# Patient Record
Sex: MALE | Race: WHITE | Employment: FULL TIME | URBAN - NONMETROPOLITAN AREA
[De-identification: names, ages, dates, MRNs, and addresses within clinical notes are randomized per-mention and may not be internally consistent; named-entity substitution may affect disease eponyms.]

---

## 2017-02-03 RX ORDER — METOPROLOL SUCCINATE 100 MG/1
TABLET, EXTENDED RELEASE ORAL
Qty: 90 TABLET | Refills: 0 | Status: SHIPPED | OUTPATIENT
Start: 2017-02-03 | End: 2017-05-12 | Stop reason: ALTCHOICE

## 2017-05-08 RX ORDER — METOPROLOL SUCCINATE 100 MG/1
TABLET, EXTENDED RELEASE ORAL
Qty: 90 TABLET | Refills: 0 | Status: SHIPPED | OUTPATIENT
Start: 2017-05-08 | End: 2017-05-12 | Stop reason: ALTCHOICE

## 2017-05-12 ENCOUNTER — TELEPHONE (OUTPATIENT)
Dept: FAMILY MEDICINE CLINIC | Facility: CLINIC | Age: 37
End: 2017-05-12

## 2017-05-12 DIAGNOSIS — I10 ESSENTIAL HYPERTENSION: Primary | ICD-10-CM

## 2017-05-12 RX ORDER — METOPROLOL SUCCINATE 50 MG/1
50 TABLET, EXTENDED RELEASE ORAL 2 TIMES DAILY
Qty: 180 TABLET | Refills: 0 | Status: SHIPPED | OUTPATIENT
Start: 2017-05-12 | End: 2017-08-09

## 2017-05-12 NOTE — TELEPHONE ENCOUNTER
metoprolol  100mg tab  1 by mouth daily    patient is requesting  50mg to be taken 2 x a day-  would Dr change the prescription and call to [facility] in [place]-  cost is much cheaper with the 50mg tablets. Please advise  Last seen 9/28/2016.

## 2017-08-09 DIAGNOSIS — I10 ESSENTIAL HYPERTENSION: ICD-10-CM

## 2017-08-09 RX ORDER — METOPROLOL SUCCINATE 50 MG/1
TABLET, EXTENDED RELEASE ORAL
Qty: 60 TABLET | Refills: 0 | Status: SHIPPED | OUTPATIENT
Start: 2017-08-09 | End: 2017-09-12

## 2017-08-09 NOTE — TELEPHONE ENCOUNTER
Last OV: 9/28/2016 122/80  Last filled: 5/12/2017 #90 no RF    Letter sent for office visit  No future appointments.

## 2017-08-21 ENCOUNTER — OFFICE VISIT (OUTPATIENT)
Dept: FAMILY MEDICINE CLINIC | Facility: CLINIC | Age: 37
End: 2017-08-21

## 2017-08-21 VITALS
BODY MASS INDEX: 27.3 KG/M2 | DIASTOLIC BLOOD PRESSURE: 78 MMHG | HEIGHT: 71 IN | WEIGHT: 195 LBS | TEMPERATURE: 98 F | HEART RATE: 84 BPM | OXYGEN SATURATION: 98 % | SYSTOLIC BLOOD PRESSURE: 130 MMHG

## 2017-08-21 DIAGNOSIS — I10 ESSENTIAL HYPERTENSION: Primary | ICD-10-CM

## 2017-08-21 DIAGNOSIS — F41.9 ANXIETY: ICD-10-CM

## 2017-08-21 PROCEDURE — 99213 OFFICE O/P EST LOW 20 MIN: CPT | Performed by: FAMILY MEDICINE

## 2017-08-21 NOTE — PROGRESS NOTES
HPI:    Patient ID: Juli Gutierrez is a 44year old male. BP stable  W/o problems w/ meds  HPI    Review of Systems   Respiratory: Negative for cough and shortness of breath. Cardiovascular: Negative for chest pain, palpitations and leg swelling.    Micky

## 2017-09-12 DIAGNOSIS — I10 ESSENTIAL HYPERTENSION: ICD-10-CM

## 2017-09-12 RX ORDER — METOPROLOL SUCCINATE 50 MG/1
TABLET, EXTENDED RELEASE ORAL
Qty: 60 TABLET | Refills: 5 | Status: SHIPPED | OUTPATIENT
Start: 2017-09-12 | End: 2018-03-18

## 2018-03-18 DIAGNOSIS — I10 ESSENTIAL HYPERTENSION: ICD-10-CM

## 2018-03-19 RX ORDER — METOPROLOL SUCCINATE 50 MG/1
TABLET, EXTENDED RELEASE ORAL
Qty: 180 TABLET | Refills: 0 | Status: SHIPPED | OUTPATIENT
Start: 2018-03-19 | End: 2018-06-14

## 2018-03-19 NOTE — TELEPHONE ENCOUNTER
Last office visit 8-21-17 130/78  Last refill 9-12-17 #60 with 5  P patient. No future appointments.

## 2018-06-14 DIAGNOSIS — I10 ESSENTIAL HYPERTENSION: ICD-10-CM

## 2018-06-14 RX ORDER — METOPROLOL SUCCINATE 50 MG/1
TABLET, EXTENDED RELEASE ORAL
Qty: 180 TABLET | Refills: 0 | Status: SHIPPED | OUTPATIENT
Start: 2018-06-14 | End: 2018-09-23

## 2018-06-22 ENCOUNTER — LAB ENCOUNTER (OUTPATIENT)
Dept: LAB | Age: 38
End: 2018-06-22
Attending: FAMILY MEDICINE
Payer: COMMERCIAL

## 2018-06-22 ENCOUNTER — OFFICE VISIT (OUTPATIENT)
Dept: FAMILY MEDICINE CLINIC | Facility: CLINIC | Age: 38
End: 2018-06-22

## 2018-06-22 VITALS
TEMPERATURE: 89 F | HEIGHT: 71 IN | SYSTOLIC BLOOD PRESSURE: 132 MMHG | WEIGHT: 200.25 LBS | DIASTOLIC BLOOD PRESSURE: 86 MMHG | BODY MASS INDEX: 28.03 KG/M2

## 2018-06-22 DIAGNOSIS — F41.9 ANXIETY: ICD-10-CM

## 2018-06-22 DIAGNOSIS — I10 ESSENTIAL HYPERTENSION: ICD-10-CM

## 2018-06-22 DIAGNOSIS — F43.29 STRESS AND ADJUSTMENT REACTION: ICD-10-CM

## 2018-06-22 DIAGNOSIS — I10 ESSENTIAL HYPERTENSION: Primary | ICD-10-CM

## 2018-06-22 PROCEDURE — 85025 COMPLETE CBC W/AUTO DIFF WBC: CPT

## 2018-06-22 PROCEDURE — 36415 COLL VENOUS BLD VENIPUNCTURE: CPT

## 2018-06-22 PROCEDURE — 82306 VITAMIN D 25 HYDROXY: CPT

## 2018-06-22 PROCEDURE — 80061 LIPID PANEL: CPT

## 2018-06-22 PROCEDURE — 80053 COMPREHEN METABOLIC PANEL: CPT

## 2018-06-22 PROCEDURE — 99214 OFFICE O/P EST MOD 30 MIN: CPT | Performed by: FAMILY MEDICINE

## 2018-06-22 NOTE — PROGRESS NOTES
HPI:    Patient ID: Raymond Valle is a 43year old male. BP stable  Increase stress / fatigue    HPI    Review of Systems   Constitutional: Positive for diaphoresis. Negative for chills and fever. Respiratory: Negative for cough and shortness of breath.

## 2018-09-23 DIAGNOSIS — I10 ESSENTIAL HYPERTENSION: ICD-10-CM

## 2018-09-24 RX ORDER — METOPROLOL SUCCINATE 50 MG/1
TABLET, EXTENDED RELEASE ORAL
Qty: 180 TABLET | Refills: 0 | Status: SHIPPED | OUTPATIENT
Start: 2018-09-24 | End: 2018-12-24

## 2018-12-24 DIAGNOSIS — I10 ESSENTIAL HYPERTENSION: ICD-10-CM

## 2018-12-24 RX ORDER — METOPROLOL SUCCINATE 50 MG/1
TABLET, EXTENDED RELEASE ORAL
Qty: 180 TABLET | Refills: 0 | Status: SHIPPED | OUTPATIENT
Start: 2018-12-24 | End: 2019-03-26

## 2019-01-01 ENCOUNTER — EXTERNAL RECORD (OUTPATIENT)
Dept: HEALTH INFORMATION MANAGEMENT | Facility: OTHER | Age: 39
End: 2019-01-01

## 2019-03-13 ENCOUNTER — PATIENT OUTREACH (OUTPATIENT)
Dept: FAMILY MEDICINE CLINIC | Facility: CLINIC | Age: 39
End: 2019-03-13

## 2019-03-26 DIAGNOSIS — I10 ESSENTIAL HYPERTENSION: ICD-10-CM

## 2019-03-26 RX ORDER — METOPROLOL SUCCINATE 50 MG/1
TABLET, EXTENDED RELEASE ORAL
Qty: 180 TABLET | Refills: 0 | Status: SHIPPED | OUTPATIENT
Start: 2019-03-26 | End: 2019-06-24

## 2019-06-24 DIAGNOSIS — I10 ESSENTIAL HYPERTENSION: ICD-10-CM

## 2019-06-24 RX ORDER — METOPROLOL SUCCINATE 50 MG/1
TABLET, EXTENDED RELEASE ORAL
Qty: 180 TABLET | Refills: 0 | Status: SHIPPED | OUTPATIENT
Start: 2019-06-24 | End: 2019-08-19 | Stop reason: CLARIF

## 2019-08-19 ENCOUNTER — OFFICE VISIT (OUTPATIENT)
Dept: FAMILY MEDICINE CLINIC | Facility: CLINIC | Age: 39
End: 2019-08-19

## 2019-08-19 ENCOUNTER — HOSPITAL ENCOUNTER (INPATIENT)
Facility: HOSPITAL | Age: 39
LOS: 1 days | Discharge: HOME | DRG: 310 | End: 2019-08-20
Attending: EMERGENCY MEDICINE | Admitting: STUDENT IN AN ORGANIZED HEALTH CARE EDUCATION/TRAINING PROGRAM
Payer: COMMERCIAL

## 2019-08-19 ENCOUNTER — APPOINTMENT (OUTPATIENT)
Dept: GENERAL RADIOLOGY | Facility: HOSPITAL | Age: 39
DRG: 310 | End: 2019-08-19
Attending: EMERGENCY MEDICINE
Payer: COMMERCIAL

## 2019-08-19 VITALS
HEART RATE: 86 BPM | OXYGEN SATURATION: 96 % | WEIGHT: 201.38 LBS | HEIGHT: 72.25 IN | DIASTOLIC BLOOD PRESSURE: 90 MMHG | TEMPERATURE: 99 F | BODY MASS INDEX: 26.98 KG/M2 | SYSTOLIC BLOOD PRESSURE: 130 MMHG | RESPIRATION RATE: 12 BRPM

## 2019-08-19 DIAGNOSIS — R07.9 CHEST PAIN OF UNCERTAIN ETIOLOGY: ICD-10-CM

## 2019-08-19 DIAGNOSIS — I48.91 NEW ONSET A-FIB (HCC): Primary | ICD-10-CM

## 2019-08-19 DIAGNOSIS — I48.91 ATRIAL FIBRILLATION, NEW ONSET (HCC): Primary | ICD-10-CM

## 2019-08-19 DIAGNOSIS — R07.9 CHEST PAIN, UNSPECIFIED TYPE: ICD-10-CM

## 2019-08-19 LAB
ALBUMIN SERPL-MCNC: 4.3 G/DL (ref 3.4–5)
ALBUMIN/GLOB SERPL: 1.3 {RATIO} (ref 1–2)
ALP LIVER SERPL-CCNC: 81 U/L (ref 45–117)
ALT SERPL-CCNC: 56 U/L (ref 16–61)
ANION GAP SERPL CALC-SCNC: 8 MMOL/L (ref 0–18)
APTT PPP: 27.2 SECONDS (ref 25.4–36.1)
AST SERPL-CCNC: 24 U/L (ref 15–37)
BASOPHILS # BLD AUTO: 0.07 X10(3) UL (ref 0–0.2)
BASOPHILS NFR BLD AUTO: 0.8 %
BILIRUB SERPL-MCNC: 2.1 MG/DL (ref 0.1–2)
BUN BLD-MCNC: 20 MG/DL (ref 7–18)
BUN/CREAT SERPL: 20.4 (ref 10–20)
CALCIUM BLD-MCNC: 9.4 MG/DL (ref 8.5–10.1)
CHLORIDE SERPL-SCNC: 106 MMOL/L (ref 98–112)
CO2 SERPL-SCNC: 26 MMOL/L (ref 21–32)
CREAT BLD-MCNC: 0.98 MG/DL (ref 0.7–1.3)
DEPRECATED RDW RBC AUTO: 38.4 FL (ref 35.1–46.3)
EOSINOPHIL # BLD AUTO: 0.34 X10(3) UL (ref 0–0.7)
EOSINOPHIL NFR BLD AUTO: 4.1 %
ERYTHROCYTE [DISTWIDTH] IN BLOOD BY AUTOMATED COUNT: 12.2 % (ref 11–15)
GLOBULIN PLAS-MCNC: 3.4 G/DL (ref 2.8–4.4)
GLUCOSE BLD-MCNC: 83 MG/DL (ref 70–99)
HAV IGM SER QL: 2.3 MG/DL (ref 1.6–2.6)
HCT VFR BLD AUTO: 46.7 % (ref 39–53)
HGB BLD-MCNC: 16.1 G/DL (ref 13–17.5)
IMM GRANULOCYTES # BLD AUTO: 0.02 X10(3) UL (ref 0–1)
IMM GRANULOCYTES NFR BLD: 0.2 %
INR BLD: 0.98 (ref 0.9–1.1)
LYMPHOCYTES # BLD AUTO: 1.83 X10(3) UL (ref 1–4)
LYMPHOCYTES NFR BLD AUTO: 21.9 %
M PROTEIN MFR SERPL ELPH: 7.7 G/DL (ref 6.4–8.2)
MCH RBC QN AUTO: 30.1 PG (ref 26–34)
MCHC RBC AUTO-ENTMCNC: 34.5 G/DL (ref 31–37)
MCV RBC AUTO: 87.3 FL (ref 80–100)
MONOCYTES # BLD AUTO: 0.9 X10(3) UL (ref 0.1–1)
MONOCYTES NFR BLD AUTO: 10.8 %
NEUTROPHILS # BLD AUTO: 5.21 X10 (3) UL (ref 1.5–7.7)
NEUTROPHILS # BLD AUTO: 5.21 X10(3) UL (ref 1.5–7.7)
NEUTROPHILS NFR BLD AUTO: 62.2 %
OSMOLALITY SERPL CALC.SUM OF ELEC: 292 MOSM/KG (ref 275–295)
PLATELET # BLD AUTO: 289 10(3)UL (ref 150–450)
POTASSIUM SERPL-SCNC: 3.7 MMOL/L (ref 3.5–5.1)
PSA SERPL DL<=0.01 NG/ML-MCNC: 13.4 SECONDS (ref 12.5–14.7)
RBC # BLD AUTO: 5.35 X10(6)UL (ref 4.3–5.7)
SODIUM SERPL-SCNC: 140 MMOL/L (ref 136–145)
TROPONIN I SERPL-MCNC: <0.045 NG/ML (ref ?–0.04)
TROPONIN I SERPL-MCNC: <0.045 NG/ML (ref ?–0.04)
TSI SER-ACNC: 2.1 MIU/ML (ref 0.36–3.74)
WBC # BLD AUTO: 8.4 X10(3) UL (ref 4–11)

## 2019-08-19 PROCEDURE — 93000 ELECTROCARDIOGRAM COMPLETE: CPT | Performed by: FAMILY MEDICINE

## 2019-08-19 PROCEDURE — 99222 1ST HOSP IP/OBS MODERATE 55: CPT | Performed by: STUDENT IN AN ORGANIZED HEALTH CARE EDUCATION/TRAINING PROGRAM

## 2019-08-19 PROCEDURE — 99214 OFFICE O/P EST MOD 30 MIN: CPT | Performed by: FAMILY MEDICINE

## 2019-08-19 PROCEDURE — 71045 X-RAY EXAM CHEST 1 VIEW: CPT | Performed by: EMERGENCY MEDICINE

## 2019-08-19 RX ORDER — METOCLOPRAMIDE HYDROCHLORIDE 5 MG/ML
10 INJECTION INTRAMUSCULAR; INTRAVENOUS EVERY 8 HOURS PRN
Status: DISCONTINUED | OUTPATIENT
Start: 2019-08-19 | End: 2019-08-20

## 2019-08-19 RX ORDER — ASPIRIN 81 MG/1
324 TABLET, CHEWABLE ORAL ONCE
Status: COMPLETED | OUTPATIENT
Start: 2019-08-19 | End: 2019-08-19

## 2019-08-19 RX ORDER — ACETAMINOPHEN 325 MG/1
650 TABLET ORAL EVERY 6 HOURS PRN
Status: DISCONTINUED | OUTPATIENT
Start: 2019-08-19 | End: 2019-08-20

## 2019-08-19 RX ORDER — NITROGLYCERIN 0.4 MG/1
0.4 TABLET SUBLINGUAL EVERY 5 MIN PRN
Status: DISCONTINUED | OUTPATIENT
Start: 2019-08-19 | End: 2019-08-20

## 2019-08-19 RX ORDER — METOPROLOL SUCCINATE 50 MG/1
50 TABLET, EXTENDED RELEASE ORAL
Status: DISCONTINUED | OUTPATIENT
Start: 2019-08-19 | End: 2019-08-20

## 2019-08-19 RX ORDER — ONDANSETRON 2 MG/ML
4 INJECTION INTRAMUSCULAR; INTRAVENOUS EVERY 6 HOURS PRN
Status: DISCONTINUED | OUTPATIENT
Start: 2019-08-19 | End: 2019-08-20

## 2019-08-19 RX ORDER — METOPROLOL SUCCINATE 50 MG/1
50 TABLET, EXTENDED RELEASE ORAL 2 TIMES DAILY
Status: ON HOLD | COMMUNITY
End: 2019-08-20

## 2019-08-19 RX ORDER — BISACODYL 10 MG
10 SUPPOSITORY, RECTAL RECTAL
Status: DISCONTINUED | OUTPATIENT
Start: 2019-08-19 | End: 2019-08-20

## 2019-08-19 RX ORDER — SODIUM CHLORIDE 9 MG/ML
125 INJECTION, SOLUTION INTRAVENOUS CONTINUOUS
Status: DISCONTINUED | OUTPATIENT
Start: 2019-08-19 | End: 2019-08-19

## 2019-08-19 RX ORDER — POTASSIUM CHLORIDE 20 MEQ/1
40 TABLET, EXTENDED RELEASE ORAL ONCE
Status: COMPLETED | OUTPATIENT
Start: 2019-08-19 | End: 2019-08-19

## 2019-08-19 RX ORDER — POLYETHYLENE GLYCOL 3350 17 G/17G
17 POWDER, FOR SOLUTION ORAL DAILY PRN
Status: DISCONTINUED | OUTPATIENT
Start: 2019-08-19 | End: 2019-08-20

## 2019-08-19 RX ORDER — SODIUM CHLORIDE 9 MG/ML
INJECTION, SOLUTION INTRAVENOUS CONTINUOUS
Status: DISCONTINUED | OUTPATIENT
Start: 2019-08-19 | End: 2019-08-20

## 2019-08-19 RX ORDER — ENOXAPARIN SODIUM 100 MG/ML
40 INJECTION SUBCUTANEOUS NIGHTLY
Status: DISCONTINUED | OUTPATIENT
Start: 2019-08-19 | End: 2019-08-20

## 2019-08-19 RX ORDER — NITROGLYCERIN 0.4 MG/1
0.4 TABLET SUBLINGUAL ONCE
Status: DISCONTINUED | OUTPATIENT
Start: 2019-08-19 | End: 2019-08-20

## 2019-08-19 RX ORDER — SODIUM PHOSPHATE, DIBASIC AND SODIUM PHOSPHATE, MONOBASIC 7; 19 G/133ML; G/133ML
1 ENEMA RECTAL ONCE AS NEEDED
Status: DISCONTINUED | OUTPATIENT
Start: 2019-08-19 | End: 2019-08-20

## 2019-08-19 RX ORDER — ASPIRIN 325 MG
325 TABLET ORAL DAILY
Status: DISCONTINUED | OUTPATIENT
Start: 2019-08-20 | End: 2019-08-20

## 2019-08-19 NOTE — ED PROVIDER NOTES
Patient Seen in: [facility] Emergency Department    History   Patient presents with:  Chest Pain Angina (cardiovascular)    Stated Complaint: cp, afib    HPI    Patient is a 79-year-old male who states for the past 2 months he has had intermittent c Physical Exam  GENERAL: Patient resting comfortably on the cart in no acute distress. HEENT: Extraocular muscles intact, pupils equal round reactive to light and accommodation. Mouth normal, neck supple, no meningismus.   LUNGS: Lungs clear to [employer] back negative. Patient heart rate is well controlled on metoprolol which he takes for blood pressure. Patient chest pain new onset A. fib was discussed with cardiology who recommended only aspirin at this time.   Patient be admitted for further evaluation

## 2019-08-19 NOTE — ED INITIAL ASSESSMENT (HPI)
Patient with c/o left sided chest pain that began last night. Patient states he has intermittently had the chest pain for a few months and last night it became worse and radiates down his left arm. Patient seen by PMD and EKG showed new onset afib.  No SOB,

## 2019-08-19 NOTE — PATIENT INSTRUCTIONS
Wife called and presents to the office. She will drive her  to the emergency room for further evaluation. Discussion regarding importance of ER evaluation.

## 2019-08-20 ENCOUNTER — APPOINTMENT (OUTPATIENT)
Dept: CV DIAGNOSTICS | Facility: HOSPITAL | Age: 39
DRG: 310 | End: 2019-08-20
Attending: INTERNAL MEDICINE
Payer: COMMERCIAL

## 2019-08-20 VITALS
BODY MASS INDEX: 26.43 KG/M2 | RESPIRATION RATE: 20 BRPM | WEIGHT: 195.13 LBS | HEART RATE: 85 BPM | TEMPERATURE: 98 F | SYSTOLIC BLOOD PRESSURE: 127 MMHG | DIASTOLIC BLOOD PRESSURE: 71 MMHG | OXYGEN SATURATION: 99 % | HEIGHT: 72 IN

## 2019-08-20 DIAGNOSIS — I48.91 ATRIAL FIBRILLATION, UNSPECIFIED TYPE (HCC): Primary | ICD-10-CM

## 2019-08-20 LAB
ANION GAP SERPL CALC-SCNC: 5 MMOL/L (ref 0–18)
ATRIAL RATE: 80 BPM
BASOPHILS # BLD AUTO: 0.06 X10(3) UL (ref 0–0.2)
BASOPHILS NFR BLD AUTO: 0.9 %
BUN BLD-MCNC: 16 MG/DL (ref 7–18)
BUN/CREAT SERPL: 20.3 (ref 10–20)
CALCIUM BLD-MCNC: 8.3 MG/DL (ref 8.5–10.1)
CHLORIDE SERPL-SCNC: 111 MMOL/L (ref 98–112)
CO2 SERPL-SCNC: 26 MMOL/L (ref 21–32)
CREAT BLD-MCNC: 0.79 MG/DL (ref 0.7–1.3)
DEPRECATED RDW RBC AUTO: 38.7 FL (ref 35.1–46.3)
EOSINOPHIL # BLD AUTO: 0.25 X10(3) UL (ref 0–0.7)
EOSINOPHIL NFR BLD AUTO: 3.8 %
ERYTHROCYTE [DISTWIDTH] IN BLOOD BY AUTOMATED COUNT: 11.9 % (ref 11–15)
GLUCOSE BLD-MCNC: 92 MG/DL (ref 70–99)
HCT VFR BLD AUTO: 43.8 % (ref 39–53)
HGB BLD-MCNC: 15 G/DL (ref 13–17.5)
IMM GRANULOCYTES # BLD AUTO: 0.01 X10(3) UL (ref 0–1)
IMM GRANULOCYTES NFR BLD: 0.2 %
LYMPHOCYTES # BLD AUTO: 1.59 X10(3) UL (ref 1–4)
LYMPHOCYTES NFR BLD AUTO: 24.5 %
MCH RBC QN AUTO: 30.5 PG (ref 26–34)
MCHC RBC AUTO-ENTMCNC: 34.2 G/DL (ref 31–37)
MCV RBC AUTO: 89 FL (ref 80–100)
MONOCYTES # BLD AUTO: 0.64 X10(3) UL (ref 0.1–1)
MONOCYTES NFR BLD AUTO: 9.8 %
NEUTROPHILS # BLD AUTO: 3.95 X10 (3) UL (ref 1.5–7.7)
NEUTROPHILS # BLD AUTO: 3.95 X10(3) UL (ref 1.5–7.7)
NEUTROPHILS NFR BLD AUTO: 60.8 %
OSMOLALITY SERPL CALC.SUM OF ELEC: 295 MOSM/KG (ref 275–295)
PLATELET # BLD AUTO: 243 10(3)UL (ref 150–450)
POTASSIUM SERPL-SCNC: 4.1 MMOL/L (ref 3.5–5.1)
Q-T INTERVAL: 360 MS
QRS DURATION: 102 MS
QTC CALCULATION (BEZET): 450 MS
R AXIS: 18 DEGREES
RBC # BLD AUTO: 4.92 X10(6)UL (ref 4.3–5.7)
SODIUM SERPL-SCNC: 142 MMOL/L (ref 136–145)
T AXIS: 77 DEGREES
TROPONIN I SERPL-MCNC: <0.045 NG/ML (ref ?–0.04)
VENTRICULAR RATE: 94 BPM
WBC # BLD AUTO: 6.5 X10(3) UL (ref 4–11)

## 2019-08-20 PROCEDURE — 93306 TTE W/DOPPLER COMPLETE: CPT | Performed by: INTERNAL MEDICINE

## 2019-08-20 PROCEDURE — 93350 STRESS TTE ONLY: CPT | Performed by: INTERNAL MEDICINE

## 2019-08-20 PROCEDURE — 99239 HOSP IP/OBS DSCHRG MGMT >30: CPT | Performed by: INTERNAL MEDICINE

## 2019-08-20 PROCEDURE — 93018 CV STRESS TEST I&R ONLY: CPT | Performed by: INTERNAL MEDICINE

## 2019-08-20 PROCEDURE — 99221 1ST HOSP IP/OBS SF/LOW 40: CPT | Performed by: INTERNAL MEDICINE

## 2019-08-20 PROCEDURE — 93017 CV STRESS TEST TRACING ONLY: CPT | Performed by: INTERNAL MEDICINE

## 2019-08-20 RX ORDER — METOPROLOL SUCCINATE 50 MG/1
50 TABLET, EXTENDED RELEASE ORAL
Qty: 60 TABLET | Refills: 5 | Status: ON HOLD | OUTPATIENT
Start: 2019-08-20 | End: 2019-09-13

## 2019-08-20 NOTE — H&P
[facility]  History and Physical     Gaurang Sotomayor Patient Status:  Inpatient    1980 MRN [id number]   [facility] 8NE-A Attending Megan Francois MD   Hosp Day # 0 PCP Jayce Wheeler DO     Chief Complaint: Chest discomfort     H Wt 195 lb 1.7 oz (88.5 kg)   SpO2 99%   BMI 26.46 kg/m²   General: No acute distress. Alert and oriented x 3. HEENT: Normocephalic atraumatic. Moist mucous membranes. EOM-I. Anicteric. Respiratory: Clear to auscultation bilaterally. No wheezes.  No rhonc

## 2019-08-20 NOTE — DISCHARGE SUMMARY
[facility]  DISCHARGE SUMMARY     Parker Motta Patient Status:  Inpatient    1980 MRN [id number]   [facility] 8NE-A Attending Shantell Almonte MD   [facility] Day # 1 PCP Rebecca Redmond DO     Date of Admission: 2019  Date of Dis signs:   Temp:  [98.1 °F (36.7 °C)-98.5 °F (36.9 °C)] 98.4 °F (36.9 °C)  Pulse:  [] 85  Resp:  [11-18] 16  BP: (419-908)/() 127/71    -----------------------------------------------------------------------------------------------  PATIENT DISCHA

## 2019-08-20 NOTE — PLAN OF CARE
Assumed patient care 0700. Patient alert/orientated x4. Afib per tele, rates controlled. Oxygen maintained on RA. No c/o chest pain or SOB at this time. Echo and stress test completed this AM. Reviewed with cards. Discharge order received.  Patient okay to baseline  Description  INTERVENTIONS:  - Continuous cardiac monitoring, monitor vital signs, obtain 12 lead EKG if indicated  - Evaluate effectiveness of antiarrhythmic and heart rate control medications as ordered  - Initiate emergency measures for life t

## 2019-08-20 NOTE — PROGRESS NOTES
[facility]  Progress Note     Kurtis Bai Patient Status:  Inpatient    1980 MRN [id number]   [facility] 8NE-A Attending Micki Goode MD   Hosp Day # 1 PCP Tri Bravo DO     Chief Complaint: chest pain    S: Patient wit Lab Results   Component Value Date    TSH 2.100 08/19/2019       Imaging: Imaging data reviewed in Epic.     Medications:   • nitroGLYCERIN  0.4 mg Sublingual Once   • enoxaparin  40 mg Subcutaneous Nightly   • aspirin  325 mg Oral Daily   • Metoprolo

## 2019-08-20 NOTE — PROGRESS NOTES
Preliminary Stress Echo Note    No ECG changes noted with 10 minutes of exercise on a Scott protocol. Achieved >100% APMHR. Denied cardiac symptoms. Occasional PVCs noted. Echo pending.

## 2019-08-20 NOTE — PROGRESS NOTES
NURSING ADMISSION NOTE      Patient admitted via Cart  Oriented to room. Safety precautions initiated. Bed in low position. Call light in reach. Patient able to demonstrate proper use of call light. Spouse at bedside.   Admission navigator questions

## 2019-08-20 NOTE — PLAN OF CARE
Report received from ER Rn. Assumed care of Pt. At 2015. Wife at bedside. Pt. Is Axox4 and on room air with O2 sat of 98%. Pt. Has clear bilateral breath sounds. Pt. Has Afib with a HR of 93. Pt.  Denies any chest pain, palpitations, shortness of breath , o threatening arrhythmias  - Monitor electrolytes and administer replacement therapy as ordered  Outcome: Progressing     Problem: PAIN - ADULT  Goal: Verbalizes/displays adequate comfort level or patient's stated pain goal  Description  INTERVENTIONS:  - En

## 2019-08-20 NOTE — CONSULTS
[address] NOTE  Cardiology Consultation    Renato Jasmine Patient Status:  Inpatient    1980 MRN [id number]   [facility] 8NE-A Attending Kenna Gonzalez MD   Hosp Day # 1 PCP Garrett Fernandez DO     Reason for Consultation:  Clyde Cruz Procedure Laterality Date   • TONSILLECTOMY     • VASECTOMY       Family History   Problem Relation Age of Onset   • Other (htn) Father    • Colon Cancer Paternal Grandmother       reports that he has never smoked.  He has never used smokeless tobacco. He Intake/Output       08/18/19 0700 - 08/19/19 0659 (Not Admitted) 08/19/19 0700 - 08/20/19 0659 08/20/19 0700 - 08/21/19 0659       Intake    P.O.  --  240  --    P.O. -- 240 --    Total Intake -- 240 --       Output    Total Output -- -- --       Net I

## 2019-08-20 NOTE — CONSULTS
[facility]  Electrophysiology Initial Consult Note      Lauri Holliday Patient Status:  Inpatient    1980 MRN [id number]   [facility] 8NE-A Attending Tejal Jean Baptiste MD   Hosp Day # 1 PCP Dread Shelton DO     Reason MG/5ML suspension 30 mL, 30 mL, Oral, Daily PRN  •  bisacodyl (DULCOLAX) rectal suppository 10 mg, 10 mg, Rectal, Daily PRN  •  FLEET ENEMA (FLEET) 7-19 GM/118ML enema 133 mL, 1 enema, Rectal, Once PRN  •  nitroGLYCERIN (NITROSTAT) SL tab 0.4 mg, 0.4 mg, S LVEF    Stress echo: no ischemia      Assessment/Plan:  Batool Villagomez is a 35year old male with PMHx of HTN who is admitted with new diagnosis of atrial fibrillation of unknown duration.     1) atrial fibrillation  - currently in AF  - continue metoprolol

## 2019-08-20 NOTE — CM/SW NOTE
pts xarelto verified. His copy is $50. Pt in agreement. sw provided pharmacy with free trial card and is applied. Pt also given $10 copay card.      RN updated

## 2019-08-21 ENCOUNTER — TELEPHONE (OUTPATIENT)
Dept: FAMILY MEDICINE CLINIC | Facility: CLINIC | Age: 39
End: 2019-08-21

## 2019-08-21 ENCOUNTER — PATIENT OUTREACH (OUTPATIENT)
Dept: CASE MANAGEMENT | Age: 39
End: 2019-08-21

## 2019-08-21 DIAGNOSIS — Z02.9 ENCOUNTERS FOR UNSPECIFIED ADMINISTRATIVE PURPOSE: ICD-10-CM

## 2019-08-21 DIAGNOSIS — I48.91 ATRIAL FIBRILLATION, NEW ONSET (HCC): ICD-10-CM

## 2019-08-21 PROCEDURE — 1111F DSCHRG MED/CURRENT MED MERGE: CPT

## 2019-08-21 NOTE — TELEPHONE ENCOUNTER
Spoke to pt for TCM today. Pt does not have an HFU appt scheduled at this time. Pt declined to schedule with [facility] d/t work conflicts. TCM/HFU appt recommended by 9/3/19 as pt is a moderate risk for readmission. Please advise.     BOOK BY DATE (last date

## 2019-08-21 NOTE — PROGRESS NOTES
Initial Post Discharge Follow Up   Discharge Date: 8/20/19  Contact Date: 8/21/2019    Consent Verification:  Assessment Completed With: Patient  HIPAA Verified?   Yes    Discharge Dx:   Acute chest pain, new onset a fib    Was TCC ordered: no    General Oral Tablet 24 Hr Take 1 tablet (50 mg total) by mouth 2x Daily(Beta Blocker).  Disp: 60 tablet Rfl: 5     • When you were leaving the hospital were any medication changes discussed with you? yes, pt to take metoprolol succinate ER 50mg - 1 tab by mouth two would you rate the care you received while in the hospital?  good       Interventions by NCM: AVS summary and instructions reviewed with pt. NCM reviewed with pt medications per d/c instructions.   Education provided about arrhythmia (a fib) and when to ca

## 2019-08-23 ENCOUNTER — TELEPHONE (OUTPATIENT)
Dept: FAMILY MEDICINE CLINIC | Facility: CLINIC | Age: 39
End: 2019-08-23

## 2019-08-23 ENCOUNTER — TELEPHONE (OUTPATIENT)
Dept: CARDIOLOGY | Age: 39
End: 2019-08-23

## 2019-08-23 DIAGNOSIS — R07.9 CHEST PAIN, UNSPECIFIED TYPE: ICD-10-CM

## 2019-08-23 DIAGNOSIS — I10 ESSENTIAL HYPERTENSION: ICD-10-CM

## 2019-08-23 DIAGNOSIS — I48.91 ATRIAL FIBRILLATION, UNSPECIFIED TYPE (CMD): Primary | ICD-10-CM

## 2019-08-23 DIAGNOSIS — I48.91 NEW ONSET A-FIB (HCC): Primary | ICD-10-CM

## 2019-08-23 NOTE — TELEPHONE ENCOUNTER
Referral placed for OV with Dr Viola Crook; and referral already placed by Dr Viola Crook for cardioversion. Wife wants to be sure Dr. Bernardo Lopez is aware Anita still not feeling any better; still having intermittent numbness down left arm. Seeing doctor on Tuesday.

## 2019-08-23 NOTE — TELEPHONE ENCOUNTER
Referral to Dr Jonelle Euceda   For cardioversion procedure at [facility].   For appt approximately  on Sept 13th.

## 2019-08-27 ENCOUNTER — TELEPHONE (OUTPATIENT)
Dept: FAMILY MEDICINE CLINIC | Facility: CLINIC | Age: 39
End: 2019-08-27

## 2019-08-27 ENCOUNTER — OFFICE VISIT (OUTPATIENT)
Dept: FAMILY MEDICINE CLINIC | Facility: CLINIC | Age: 39
End: 2019-08-27

## 2019-08-27 ENCOUNTER — TELEPHONE (OUTPATIENT)
Dept: CARDIOLOGY | Age: 39
End: 2019-08-27

## 2019-08-27 VITALS
OXYGEN SATURATION: 98 % | WEIGHT: 208 LBS | HEART RATE: 88 BPM | TEMPERATURE: 98 F | BODY MASS INDEX: 27.87 KG/M2 | DIASTOLIC BLOOD PRESSURE: 78 MMHG | HEIGHT: 72.25 IN | RESPIRATION RATE: 18 BRPM | SYSTOLIC BLOOD PRESSURE: 118 MMHG

## 2019-08-27 DIAGNOSIS — F43.29 STRESS AND ADJUSTMENT REACTION: ICD-10-CM

## 2019-08-27 DIAGNOSIS — I48.91 ATRIAL FIBRILLATION, UNSPECIFIED TYPE (CMD): Primary | ICD-10-CM

## 2019-08-27 DIAGNOSIS — I48.91 ATRIAL FIBRILLATION, NEW ONSET (HCC): Primary | ICD-10-CM

## 2019-08-27 DIAGNOSIS — I10 ESSENTIAL HYPERTENSION: ICD-10-CM

## 2019-08-27 PROCEDURE — 99214 OFFICE O/P EST MOD 30 MIN: CPT | Performed by: FAMILY MEDICINE

## 2019-08-27 NOTE — TELEPHONE ENCOUNTER
Marcin Maciel sent to Jodee Frank RN             Certain physicians are contracted with [facility] Dr Raymond Lynch is not.     Previous Messages         called and left message at [facility] as to being told Dr Raymond Lynch not contracted with this pa

## 2019-08-27 NOTE — PATIENT INSTRUCTIONS
Follow-up with cardiology as directed. Medications as directed. Reassurance given to patient. Counseling. Follow-up as needed. Reevaluation in 1 month.

## 2019-08-27 NOTE — PROGRESS NOTES
HPI:    Patient ID: Shavon Jacobo is a 35year old male. Patient without problems with medications. Working. Tires easily. Denies shortness of breath, pain. Feels he is trying to deal with stress better.   Denies orthopnea, paroxysmal nocturnal dyspnea

## 2019-08-27 NOTE — TELEPHONE ENCOUNTER
Sally calls back and directs to change referral to Dr Yadira Schwarz. Referral dept in their office aware of situation. She has spoken to wife about situation. Dr Zuniga Him aware. Chanda Ramirez at [place].

## 2019-09-03 NOTE — H&P
Pt seen and examined, chart reviewed. Agree with above note. No interval change in status.     Mary Anne Hyman MD  [address] [facility]  Cardiac Electrophysiolgy  [phone]

## 2019-09-11 NOTE — HISTORICAL OFFICE NOTE
[facility]  Electrophysiology Initial Consult Note              Twylla Sacks Patient Status:  Inpatient    1980 MRN [id number]   [facility] 8NE-A Attending Dawna Palm MD   Hosp Day # 1 PCP Thong Stallings DO     hydroxide (MILK OF MAGNESIA) 400 MG/5ML suspension 30 mL, 30 mL, Oral, Daily PRN  •  bisacodyl (DULCOLAX) rectal suppository 10 mg, 10 mg, Rectal, Daily PRN  •  FLEET ENEMA (FLEET) 7-19 GM/118ML enema 133 mL, 1 enema, Rectal, Once PRN  •  nitroGLYCERIN (NI (personally reviewed): atrial fibrillation     TTE: normal LVEF     Stress echo: no ischemia        Assessment/Plan:  Kurtis Bai is a 35year old male with PMHx of HTN who is admitted with new diagnosis of atrial fibrillation of unknown duration.      1)

## 2019-09-13 ENCOUNTER — HOSPITAL ENCOUNTER (OUTPATIENT)
Dept: INTERVENTIONAL RADIOLOGY/VASCULAR | Facility: HOSPITAL | Age: 39
Discharge: HOME | End: 2019-09-13
Attending: INTERNAL MEDICINE | Admitting: INTERNAL MEDICINE
Payer: COMMERCIAL

## 2019-09-13 VITALS
OXYGEN SATURATION: 100 % | HEIGHT: 72 IN | TEMPERATURE: 98 F | SYSTOLIC BLOOD PRESSURE: 122 MMHG | RESPIRATION RATE: 8 BRPM | WEIGHT: 190 LBS | BODY MASS INDEX: 25.73 KG/M2 | HEART RATE: 67 BPM | DIASTOLIC BLOOD PRESSURE: 73 MMHG

## 2019-09-13 DIAGNOSIS — I48.91 ATRIAL FIBRILLATION (HCC): ICD-10-CM

## 2019-09-13 LAB
ATRIAL RATE: 74 BPM
P AXIS: 74 DEGREES
P-R INTERVAL: 166 MS
Q-T INTERVAL: 404 MS
QRS DURATION: 116 MS
QTC CALCULATION (BEZET): 448 MS
R AXIS: 14 DEGREES
T AXIS: 52 DEGREES
VENTRICULAR RATE: 74 BPM

## 2019-09-13 PROCEDURE — 92960 CARDIOVERSION ELECTRIC EXT: CPT

## 2019-09-13 PROCEDURE — 5A2204Z RESTORATION OF CARDIAC RHYTHM, SINGLE: ICD-10-PCS | Performed by: INTERNAL MEDICINE

## 2019-09-13 PROCEDURE — 93005 ELECTROCARDIOGRAM TRACING: CPT

## 2019-09-13 PROCEDURE — 93010 ELECTROCARDIOGRAM REPORT: CPT | Performed by: INTERNAL MEDICINE

## 2019-09-13 PROCEDURE — 92960 CARDIOVERSION ELECTRIC EXT: CPT | Performed by: INTERNAL MEDICINE

## 2019-09-13 RX ORDER — SODIUM CHLORIDE 9 MG/ML
INJECTION, SOLUTION INTRAVENOUS CONTINUOUS
Status: DISCONTINUED | OUTPATIENT
Start: 2019-09-13 | End: 2019-09-13

## 2019-09-13 RX ORDER — FLECAINIDE ACETATE 100 MG/1
100 TABLET ORAL 2 TIMES DAILY
Status: ON HOLD | COMMUNITY
End: 2019-09-13

## 2019-09-13 RX ORDER — METOPROLOL SUCCINATE 50 MG/1
50 TABLET, EXTENDED RELEASE ORAL DAILY
Qty: 60 TABLET | Refills: 5 | Status: SHIPPED | OUTPATIENT
Start: 2019-09-13

## 2019-09-13 RX ORDER — FLECAINIDE ACETATE 100 MG/1
200 TABLET ORAL ONCE
Status: COMPLETED | OUTPATIENT
Start: 2019-09-13 | End: 2019-09-13

## 2019-09-13 RX ORDER — FLECAINIDE ACETATE 100 MG/1
100 TABLET ORAL 2 TIMES DAILY
Qty: 120 TABLET | Refills: 1 | Status: ON HOLD | OUTPATIENT
Start: 2019-09-13 | End: 2020-01-10

## 2019-09-13 RX ORDER — FLECAINIDE ACETATE 100 MG/1
100 TABLET ORAL EVERY 12 HOURS SCHEDULED
Status: DISCONTINUED | OUTPATIENT
Start: 2019-09-13 | End: 2019-09-13

## 2019-09-13 RX ADMIN — Medication 90 MG: at 08:10:00

## 2019-09-13 RX ADMIN — Medication 70 MG: at 12:45:00

## 2019-09-13 RX ADMIN — FLECAINIDE ACETATE 200 MG: 100 TABLET ORAL at 08:36:00

## 2019-09-13 RX ADMIN — SODIUM CHLORIDE: 9 INJECTION, SOLUTION INTRAVENOUS at 06:56:00

## 2019-09-13 RX ADMIN — SODIUM CHLORIDE: 9 INJECTION, SOLUTION INTRAVENOUS at 13:15:00

## 2019-09-13 NOTE — PROGRESS NOTES
Second attempt for a cardioversion made after flecainide 200mg PO given. Pt was in afib and was converted to NSR at 360 joules insync with Dr. David Arrieta. Brevital 70mg was given prior to the cardioversion by Dr. David Arrieta. EKG done after to confirm NSR.  Pt recov

## 2019-09-13 NOTE — PROGRESS NOTES
Pt arrived to holding in afib. Unsuccessful cardioversion with Dr. Beryle Even at 200 joules in sync, two attempts. Dr. Beryle Even after spoke to pt and pt wife.  Pt awake and was given flecainide 200mg oral one time and kept on monitor to see if he will convert to

## 2019-09-13 NOTE — PROCEDURES
PROCEDURE(S) PERFORMED:    1. Cardioversion. 2.     Sedation     :  Fernando Tavares MD     ANESTHESIA:  IV sedation. INDICATION:  Persistent atrial fibrillation. COMPLICATIONS:  None.      METHODS:  The patient was brought to the outpatien

## 2019-09-16 ENCOUNTER — TELEPHONE (OUTPATIENT)
Dept: FAMILY MEDICINE CLINIC | Facility: CLINIC | Age: 39
End: 2019-09-16

## 2019-09-16 DIAGNOSIS — I48.91 ATRIAL FIBRILLATION, NEW ONSET (HCC): Primary | ICD-10-CM

## 2019-09-16 RX ORDER — RIVAROXABAN 20 MG/1
TABLET, FILM COATED ORAL
Qty: 30 TABLET | Refills: 0 | Status: SHIPPED | OUTPATIENT
Start: 2019-09-16 | End: 2019-10-14 | Stop reason: SDUPTHER

## 2019-09-16 NOTE — TELEPHONE ENCOUNTER
PATIENT INSTRUCTED NEEDS REFILLED THROUGH CARDIOLOGIST OFFICE-   IF HAS TROUBLE GETTING THROUGH DR GARCIA'S OFFICE FOR REFILL LET DR Katarzyna Silva KNOW- WE CAN GIVE HIM A FEW SAMPLES.   EJ/CJ

## 2019-09-20 ENCOUNTER — APPOINTMENT (OUTPATIENT)
Dept: CARDIOLOGY | Age: 39
End: 2019-09-20

## 2019-10-03 RX ORDER — METOPROLOL SUCCINATE 50 MG/1
50 TABLET, EXTENDED RELEASE ORAL DAILY
COMMUNITY
Start: 2019-08-20 | End: 2020-10-20 | Stop reason: SDUPTHER

## 2019-10-04 ENCOUNTER — OFFICE VISIT (OUTPATIENT)
Dept: CARDIOLOGY | Age: 39
End: 2019-10-04

## 2019-10-04 VITALS
DIASTOLIC BLOOD PRESSURE: 66 MMHG | HEIGHT: 73 IN | BODY MASS INDEX: 27.43 KG/M2 | HEART RATE: 65 BPM | WEIGHT: 207 LBS | SYSTOLIC BLOOD PRESSURE: 110 MMHG

## 2019-10-04 DIAGNOSIS — Z09 HOSPITAL DISCHARGE FOLLOW-UP: Primary | ICD-10-CM

## 2019-10-04 DIAGNOSIS — R00.2 PALPITATIONS: ICD-10-CM

## 2019-10-04 DIAGNOSIS — Z86.79 ATRIAL FIBRILLATION, CURRENTLY IN SINUS RHYTHM: ICD-10-CM

## 2019-10-04 PROBLEM — I48.91 ATRIAL FIBRILLATION, NEW ONSET (CMD): Status: ACTIVE | Noted: 2019-08-19

## 2019-10-04 PROCEDURE — 3078F DIAST BP <80 MM HG: CPT | Performed by: NURSE PRACTITIONER

## 2019-10-04 PROCEDURE — 99205 OFFICE O/P NEW HI 60 MIN: CPT | Performed by: NURSE PRACTITIONER

## 2019-10-04 PROCEDURE — 93000 ELECTROCARDIOGRAM COMPLETE: CPT | Performed by: NURSE PRACTITIONER

## 2019-10-04 PROCEDURE — 3074F SYST BP LT 130 MM HG: CPT | Performed by: NURSE PRACTITIONER

## 2019-10-04 RX ORDER — FLECAINIDE ACETATE 100 MG/1
100 TABLET ORAL 2 TIMES DAILY
COMMUNITY
End: 2020-01-10 | Stop reason: SDUPTHER

## 2019-10-04 SDOH — HEALTH STABILITY: PHYSICAL HEALTH: ON AVERAGE, HOW MANY DAYS PER WEEK DO YOU ENGAGE IN MODERATE TO STRENUOUS EXERCISE (LIKE A BRISK WALK)?: 0 DAYS

## 2019-10-04 SDOH — HEALTH STABILITY: PHYSICAL HEALTH: ON AVERAGE, HOW MANY MINUTES DO YOU ENGAGE IN EXERCISE AT THIS LEVEL?: 0 MIN

## 2019-10-04 ASSESSMENT — ENCOUNTER SYMPTOMS
CHILLS: 0
WEIGHT GAIN: 0
HEMOPTYSIS: 0
ALLERGIC/IMMUNOLOGIC COMMENTS: NO NEW FOOD ALLERGIES
COUGH: 0
HEMATOCHEZIA: 0
WEIGHT LOSS: 0
BRUISES/BLEEDS EASILY: 0
SUSPICIOUS LESIONS: 0
FEVER: 0

## 2019-10-14 RX ORDER — RIVAROXABAN 20 MG/1
TABLET, FILM COATED ORAL
Qty: 30 TABLET | Refills: 6 | Status: SHIPPED | OUTPATIENT
Start: 2019-10-14 | End: 2020-04-21 | Stop reason: SDUPTHER

## 2019-11-21 ENCOUNTER — HOSPITAL ENCOUNTER (OUTPATIENT)
Dept: CV DIAGNOSTICS | Facility: HOSPITAL | Age: 39
Discharge: HOME | End: 2019-11-21
Attending: NURSE PRACTITIONER
Payer: COMMERCIAL

## 2019-11-21 DIAGNOSIS — I48.91 ATRIAL FIBRILLATION, UNSPECIFIED TYPE (HCC): ICD-10-CM

## 2019-11-21 DIAGNOSIS — R00.2 PALPITATIONS: ICD-10-CM

## 2019-11-21 PROCEDURE — 93225 XTRNL ECG REC<48 HRS REC: CPT | Performed by: NURSE PRACTITIONER

## 2019-11-21 PROCEDURE — 93227 XTRNL ECG REC<48 HR R&I: CPT | Performed by: NURSE PRACTITIONER

## 2019-11-21 PROCEDURE — 93226 XTRNL ECG REC<48 HR SCAN A/R: CPT | Performed by: NURSE PRACTITIONER

## 2019-12-08 ENCOUNTER — HOSPITAL ENCOUNTER (EMERGENCY)
Facility: HOSPITAL | Age: 39
Discharge: HOME | End: 2019-12-08
Attending: EMERGENCY MEDICINE
Payer: COMMERCIAL

## 2019-12-08 ENCOUNTER — APPOINTMENT (OUTPATIENT)
Dept: GENERAL RADIOLOGY | Facility: HOSPITAL | Age: 39
End: 2019-12-08
Payer: COMMERCIAL

## 2019-12-08 VITALS
HEIGHT: 68 IN | WEIGHT: 189.63 LBS | SYSTOLIC BLOOD PRESSURE: 138 MMHG | DIASTOLIC BLOOD PRESSURE: 94 MMHG | TEMPERATURE: 99 F | OXYGEN SATURATION: 96 % | RESPIRATION RATE: 22 BRPM | HEART RATE: 89 BPM | BODY MASS INDEX: 28.74 KG/M2

## 2019-12-08 DIAGNOSIS — S29.012A STRAIN OF THORACIC SPINE: ICD-10-CM

## 2019-12-08 DIAGNOSIS — I48.91 ATRIAL FIBRILLATION WITH CONTROLLED VENTRICULAR RESPONSE (HCC): Primary | ICD-10-CM

## 2019-12-08 PROCEDURE — 80053 COMPREHEN METABOLIC PANEL: CPT

## 2019-12-08 PROCEDURE — 84484 ASSAY OF TROPONIN QUANT: CPT

## 2019-12-08 PROCEDURE — 80053 COMPREHEN METABOLIC PANEL: CPT | Performed by: EMERGENCY MEDICINE

## 2019-12-08 PROCEDURE — 99284 EMERGENCY DEPT VISIT MOD MDM: CPT | Performed by: EMERGENCY MEDICINE

## 2019-12-08 PROCEDURE — 93005 ELECTROCARDIOGRAM TRACING: CPT

## 2019-12-08 PROCEDURE — 36415 COLL VENOUS BLD VENIPUNCTURE: CPT | Performed by: EMERGENCY MEDICINE

## 2019-12-08 PROCEDURE — 85025 COMPLETE CBC W/AUTO DIFF WBC: CPT

## 2019-12-08 PROCEDURE — 93010 ELECTROCARDIOGRAM REPORT: CPT | Performed by: EMERGENCY MEDICINE

## 2019-12-08 PROCEDURE — 84484 ASSAY OF TROPONIN QUANT: CPT | Performed by: EMERGENCY MEDICINE

## 2019-12-08 PROCEDURE — 71045 X-RAY EXAM CHEST 1 VIEW: CPT

## 2019-12-08 PROCEDURE — 85025 COMPLETE CBC W/AUTO DIFF WBC: CPT | Performed by: EMERGENCY MEDICINE

## 2019-12-08 RX ORDER — ASPIRIN 81 MG/1
324 TABLET, CHEWABLE ORAL ONCE
Status: COMPLETED | OUTPATIENT
Start: 2019-12-08 | End: 2019-12-08

## 2019-12-08 RX ORDER — METOPROLOL TARTRATE 5 MG/5ML
5 INJECTION INTRAVENOUS ONCE
Status: DISCONTINUED | OUTPATIENT
Start: 2019-12-08 | End: 2019-12-08

## 2019-12-08 NOTE — ED INITIAL ASSESSMENT (HPI)
Pt to ED with complaints of chest pain since yesterday that became worse today radiating towards back.

## 2019-12-09 ENCOUNTER — TELEPHONE (OUTPATIENT)
Dept: FAMILY MEDICINE CLINIC | Facility: CLINIC | Age: 39
End: 2019-12-09

## 2019-12-09 ENCOUNTER — TELEPHONE (OUTPATIENT)
Dept: CARDIOLOGY | Age: 39
End: 2019-12-09

## 2019-12-09 DIAGNOSIS — I48.20 CHRONIC ATRIAL FIBRILLATION (HCC): Primary | ICD-10-CM

## 2019-12-09 NOTE — ED PROVIDER NOTES
Patient Seen in: [facility] Emergency Department      History   Patient presents with:  Chest Pain Angina    Stated Complaint: chest pain    HPI    13-year-old male with a known history of atrial fibrillation presents with an episode of chest pain t Current:BP (!) 138/94   Pulse 89   Temp 98.7 °F (37.1 °C) (Temporal)   Resp 22   Ht 172.7 cm (5' 8\")   Wt 86 kg   SpO2 96%   BMI 28.83 kg/m²         Physical Exam  Vitals signs and nursing note reviewed.    Constitutional:       General: He is not in acute RAINBOW DRAW LAVENDER   RAINBOW DRAW LIGHT GREEN   RAINBOW DRAW GOLD   CBC W/ DIFFERENTIAL     EKG    Rate, intervals and axes as noted on EKG Report.   Rate: 93  Rhythm: Atrial Fibrillation  Reading: No acute ST segment changes              Xr Chest Ap Por Clinical Impression:  Atrial fibrillation with controlled ventricular response (HCC)  (primary encounter diagnosis)  Strain of thoracic spine    Disposition:  Discharge  12/8/2019  7:38 pm    Follow-up:  MD Chrissie Laurent 33

## 2019-12-09 NOTE — TELEPHONE ENCOUNTER
Per wife- he is back in at [facility]; seeing Dr Veronica Maradiaga in office on 12/11/2019. Next step will be an ablation. Update to Dr Keo Boyer. Referral in system.   ej/cj

## 2019-12-11 ENCOUNTER — OFFICE VISIT (OUTPATIENT)
Dept: CARDIOLOGY | Age: 39
End: 2019-12-11

## 2019-12-11 VITALS
HEIGHT: 73 IN | BODY MASS INDEX: 27.57 KG/M2 | DIASTOLIC BLOOD PRESSURE: 80 MMHG | HEART RATE: 71 BPM | SYSTOLIC BLOOD PRESSURE: 120 MMHG | WEIGHT: 208 LBS

## 2019-12-11 DIAGNOSIS — Z86.79 ATRIAL FIBRILLATION, CURRENTLY IN SINUS RHYTHM: Primary | ICD-10-CM

## 2019-12-11 DIAGNOSIS — R00.2 PALPITATIONS: ICD-10-CM

## 2019-12-11 PROCEDURE — 99215 OFFICE O/P EST HI 40 MIN: CPT | Performed by: INTERNAL MEDICINE

## 2019-12-11 PROCEDURE — 3074F SYST BP LT 130 MM HG: CPT | Performed by: INTERNAL MEDICINE

## 2019-12-11 PROCEDURE — 3079F DIAST BP 80-89 MM HG: CPT | Performed by: INTERNAL MEDICINE

## 2019-12-11 PROCEDURE — 93000 ELECTROCARDIOGRAM COMPLETE: CPT | Performed by: INTERNAL MEDICINE

## 2019-12-16 ENCOUNTER — TELEPHONE (OUTPATIENT)
Dept: CARDIOLOGY | Age: 39
End: 2019-12-16

## 2020-01-09 NOTE — H&P
Progress Notes  - documented in this encounter  Mary Ruiz MD - 2019 1:00 PM CST  Formatting of this note might be different from the original.  [facility]  Clinic Note    Anita Murillo  : 1980  PCP: Derik Ley DO routine.     PMHx:  Past Medical History:   Diagnosis Date   • Anxiety   • Atrial fibrillation (CMS/HCC)   • Chest pain   • Hypertension   • Palpitations     PSHx:  Past Surgical History:   Procedure Laterality Date   • Cardioversion 09/13/2019   • [place]

## 2020-01-10 ENCOUNTER — HOSPITAL ENCOUNTER (OUTPATIENT)
Dept: INTERVENTIONAL RADIOLOGY/VASCULAR | Facility: HOSPITAL | Age: 40
Discharge: HOME | End: 2020-01-10
Attending: INTERNAL MEDICINE | Admitting: INTERNAL MEDICINE
Payer: COMMERCIAL

## 2020-01-10 VITALS
HEART RATE: 70 BPM | DIASTOLIC BLOOD PRESSURE: 66 MMHG | SYSTOLIC BLOOD PRESSURE: 119 MMHG | OXYGEN SATURATION: 100 % | BODY MASS INDEX: 26.51 KG/M2 | TEMPERATURE: 98 F | RESPIRATION RATE: 24 BRPM | WEIGHT: 200 LBS | HEIGHT: 73 IN

## 2020-01-10 DIAGNOSIS — I48.91 ATRIAL FIBRILLATION, UNSPECIFIED TYPE (HCC): ICD-10-CM

## 2020-01-10 LAB
ATRIAL RATE: 67 BPM
P AXIS: 75 DEGREES
P-R INTERVAL: 182 MS
Q-T INTERVAL: 418 MS
QRS DURATION: 122 MS
QTC CALCULATION (BEZET): 441 MS
R AXIS: 1 DEGREES
T AXIS: 47 DEGREES
VENTRICULAR RATE: 67 BPM

## 2020-01-10 PROCEDURE — 93005 ELECTROCARDIOGRAM TRACING: CPT

## 2020-01-10 PROCEDURE — 92960 CARDIOVERSION ELECTRIC EXT: CPT | Performed by: INTERNAL MEDICINE

## 2020-01-10 PROCEDURE — 93010 ELECTROCARDIOGRAM REPORT: CPT | Performed by: INTERNAL MEDICINE

## 2020-01-10 PROCEDURE — 5A2204Z RESTORATION OF CARDIAC RHYTHM, SINGLE: ICD-10-PCS | Performed by: INTERNAL MEDICINE

## 2020-01-10 PROCEDURE — 92960 CARDIOVERSION ELECTRIC EXT: CPT

## 2020-01-10 RX ORDER — SODIUM CHLORIDE 9 MG/ML
INJECTION, SOLUTION INTRAVENOUS CONTINUOUS
Status: DISCONTINUED | OUTPATIENT
Start: 2020-01-10 | End: 2020-01-10

## 2020-01-10 RX ORDER — FLECAINIDE ACETATE 100 MG/1
150 TABLET ORAL 2 TIMES DAILY
Qty: 120 TABLET | Refills: 1 | Status: SHIPPED | OUTPATIENT
Start: 2020-01-10

## 2020-01-10 RX ORDER — FLECAINIDE ACETATE 100 MG/1
TABLET ORAL
Qty: 180 TABLET | Refills: 1 | Status: SHIPPED | OUTPATIENT
Start: 2020-01-10 | End: 2020-01-21 | Stop reason: DRUGHIGH

## 2020-01-10 RX ADMIN — Medication 100 MG: at 11:03:00

## 2020-01-10 NOTE — H&P
[facility]  H&P    Anita Murillo Patient Status:  Outpatient in a Bed    1980 MRN [id number]   Location [address] [facility] Attending No att. providers found   Hosp Day # 0 PCP Fanny Alberts DO oz  09/11/19 : 190 lb        General: Alert and oriented in no apparent distress. HEENT: No focal deficits. Neck: No JVD, carotids 2+ no bruits. Cardiac: Regular rate and rhythm, S1, S2 normal, no murmur, rub or gallop.   Lungs: Clear without wheezes, ra

## 2020-01-10 NOTE — PROGRESS NOTES
S/P cardioversion. Brevital 100mg given for sedation, patient cardioverted with one syncronize shock at 200J. Hemodynamics remain stable. Neuro intact. EKG completed. Will continue to monitor.

## 2020-01-10 NOTE — PROGRESS NOTES
Post cardioversion, patient remains A&Ox 4. Hemodynamics remain stable. Tolerating PO intake. Discharge orders received on patient. Patient verbalizes understanding of all discharge orders. IV removed. Patient DC'ed home.

## 2020-01-10 NOTE — PROCEDURES
PROCEDURE(S) PERFORMED:    1. Cardioversion. 2.     Sedation     :  Frankie Carr MD     ANESTHESIA:  IV sedation. INDICATION:  Persistent atrial fibrillation. COMPLICATIONS:  None.      METHODS:  The patient was brought to the outpatien

## 2020-01-21 ENCOUNTER — OFFICE VISIT (OUTPATIENT)
Dept: CARDIOLOGY | Age: 40
End: 2020-01-21

## 2020-01-21 VITALS
WEIGHT: 208 LBS | HEART RATE: 70 BPM | DIASTOLIC BLOOD PRESSURE: 90 MMHG | HEIGHT: 73 IN | BODY MASS INDEX: 27.57 KG/M2 | SYSTOLIC BLOOD PRESSURE: 130 MMHG

## 2020-01-21 DIAGNOSIS — R00.2 PALPITATIONS: ICD-10-CM

## 2020-01-21 DIAGNOSIS — Z79.899 ENCOUNTER FOR MONITORING FLECAINIDE THERAPY: ICD-10-CM

## 2020-01-21 DIAGNOSIS — Z86.79 ATRIAL FIBRILLATION, CURRENTLY IN SINUS RHYTHM: Primary | ICD-10-CM

## 2020-01-21 DIAGNOSIS — Z51.81 ENCOUNTER FOR MONITORING FLECAINIDE THERAPY: ICD-10-CM

## 2020-01-21 DIAGNOSIS — I10 ESSENTIAL HYPERTENSION: ICD-10-CM

## 2020-01-21 PROCEDURE — 99214 OFFICE O/P EST MOD 30 MIN: CPT | Performed by: INTERNAL MEDICINE

## 2020-01-21 PROCEDURE — 93000 ELECTROCARDIOGRAM COMPLETE: CPT | Performed by: INTERNAL MEDICINE

## 2020-01-21 RX ORDER — FLECAINIDE ACETATE 150 MG/1
TABLET ORAL
Refills: 2 | COMMUNITY
Start: 2020-01-10 | End: 2020-04-21 | Stop reason: SDUPTHER

## 2020-01-21 SDOH — HEALTH STABILITY: PHYSICAL HEALTH: ON AVERAGE, HOW MANY DAYS PER WEEK DO YOU ENGAGE IN MODERATE TO STRENUOUS EXERCISE (LIKE A BRISK WALK)?: 0 DAYS

## 2020-01-21 SDOH — HEALTH STABILITY: PHYSICAL HEALTH: ON AVERAGE, HOW MANY MINUTES DO YOU ENGAGE IN EXERCISE AT THIS LEVEL?: 0 MIN

## 2020-01-21 ASSESSMENT — PATIENT HEALTH QUESTIONNAIRE - PHQ9
SUM OF ALL RESPONSES TO PHQ9 QUESTIONS 1 AND 2: 0
2. FEELING DOWN, DEPRESSED OR HOPELESS: NOT AT ALL
1. LITTLE INTEREST OR PLEASURE IN DOING THINGS: NOT AT ALL
SUM OF ALL RESPONSES TO PHQ9 QUESTIONS 1 AND 2: 0

## 2020-03-11 NOTE — TELEPHONE ENCOUNTER
PATIENT ADVISED. Laci Arevalo  APPT MADE    Future Appointments  Date Time Provider Fifi Bowman   8/21/2017 5:30 PM Tessie Blizzard, DO [facility] Referred by:Bethany Oconnor MD Medical Diagnosis (from order):    Diagnosis Information      Diagnosis    825.0 (ICD-9-CM) - S92.061A (ICD-10-CM) - Closed displaced intraarticular fracture of right calcaneus                Physical Therapy -  Daily Treatment Note    Visit:  3   Next referring provider appointment: 5/14/2020    Diagnosis Precautions: NWB 0-6 weeks. WBAT in boot at 6 weeks, transition to shoe at 10 weeks (12/12/19). See modified calcaneal fracture protocol from Bethany Oconnor MD DOI: 10/3/19.   12/27/2019: Pt is allowed to WB in shoe with custom orthotic, custom orthotic being processed    1/17/2020: Custom orthotic obtained. May ambulate in shoes with orthotic as tolerated    SUBJECTIVE                                                                                                             EVAL: Pt reports falling while putting up decorations. Sustained a Right closed fracture of ankle and subtalar. She has been NWB in a boot since. She has limited her ambulation as she is still recovering from her R shoulder surgery. Currently she reports minimal pain unless the area is touched.    She is also deaf and mute.  Camille is present for visit    Pt is not employed but does typically help care for her grandchildren    1/17/2020: Pt reports that she received her orthotic yesterday. She tried wearing it but only lasted one hour before she had more pain in her heel.  She understands that it will take a while to adjust to it.    1/21/20: Pt reports she has been sore as she has started walking in her shoe, alternating with the boot throughout the day per her orthotist.    1/23/20: Pt states she feels a lot better today, rested her foot the last two days and it has helped.    1/28/20: Pt states she saw the doctor this morning for a followup, was told everything is healing well and she can alternate between boot and the shoe, favoring the shoe as much as she can, up to her judgement.      1/31/20: Pt  states that she has been wearing the shoe a little more but can only be in it for about 45 min before her pain gets to a 6/10. She's been ramon back and forth between shoe and boot as able.    2/4/20: Pt states she has been wearing the shoe almost all the time, has only worn the boot once since her last therapy session, is sticking to resting while in the shoe if it gets really sore.    2/25/20: Pt reports she is a little stiffer with the cold, feels it in her ankle and shoulder but otherwise has been good. Pt states her trip was wonderful, her foot was on and off during the trip, some days it got very sore but she was out walking all day every day. Was able to use a wheelchair intermittently throughout the week, wore the boot often when out of their hotel except for the beach. She estimates 3-4 hours a day in the boot, the rest of the time wore shoes.    3/4/20: Pt reports her foot feels ok, is a little sore because she went shopping yesterday and was walking for over 2 hours, was sore later in the day but did ice it. Has not worn the boot at all since last appointment. Is only noticing limping first thing in the morning but it goes away in less than 30 minutes.    3/11/20: Pt states her foot has been very sore since last session, has been doing HEP on and off because of the soreness. Has been walking on it more than normal though. Most of the discomfort is along the outside of her foot and ankle where she had the fracture.   Functional Change: Pt able to ambulate in normal footwear /c custom orthotic insert.      Pain / Symptoms:  Pain rating (out of 10): Current: 2 ; Best: 0; Worst: 4  Quality / Description: ache.  Progression since onset: improved    OBJECTIVE                                                                                                                     Observation:   Comments / Details: Antalgic gait with normal step length. Improves with shortened stride length. Decreased toe off noted.      Observed Gait:     Weight bearing: Right: full     Non compensated independent gait with boot on  Range of Motion (ROM) (norms in parentheses, measurement in degrees unless noted):      Ankle Dorsiflexion (20): Left: Active: 15 Right: Active: 14   Ankle Plantar Flexion (40-50): Left: Active: 60 Right: Active: 60  Ankle Inversion (30-35): Left: Active: 22 Right: Active: 22  Ankle Eversion (15-20): Left: Active: 16 Right: Active: 16      Strength  out of 5 unless otherwise indicated, standard test position unless noted, lbs tested with hand held dynamometer:   Hip Flexion: Left: 5 Right: 5  Hip Abduction: Left: 4+ Right: 4+,   Knee Flexion: Left: 5 Right: 5  Knee Extension: Left: 5 Right: 5  Ankle Dorsiflexion: Left: 5 Right: 5  Ankle Plantar Flexion: Left: 5 Right: 5  Ankle Inversion: Left: 5 Right: 5   Ankle Eversion: Left: 4+ Right: 4+    Palpation:       Right: achilles insertion tenderness; calcaneal fat pad tenderness;     Ankle/Foot Circumference  Figure 8: Left: 46 cm Right: 47.5 cm     TREATMENT                                                                                                                  Therapeutic Exercise:  Ankle AROM  Assisted PF/DF/Inv/Ev Stretching 2x30\"    DF/PF 2x10 G THB  Toe Flex/Ext x 10 - NT  Ankle circles x 10 - NT  Ankle alphabet x 1 full alphabet    Seated DF/PF 2x8 R THB, 1x8 G THB - HEP     Tennis Ball Plantar Roll - NT    NuStep 6 mins Level 1-5  (focus on flat foot/stretching) - NT    LAQ 1x10 4#, 1x10 5#, HS Curls 2x10 Blue THB - NT  Standing R Hip Abd R 3# ankle weight 2x8  Sidelying Hip Abd 1x10, 1x10 2# - NT    Airex R LE Pressdowns, A/P weight shift from sitting - NT    Standing Calf stretch in shoe 2 x 20s  Standing Toe extension stretch 10 x 3s    Total Gym Level 7 Heel Raises 2x10, Squats 2x10 (B), SLS Squats 2x5    Recumbent Bike 5 mins level 1-3  NT    In shoe:  Marching in place, side stepping (even surface and airex)  Marching on air ex  SLS Airex  2x20\"  Side stepping on 1/2 foam roll  Heel Raises 2x10    6\" step lateral step downs /c 3\" eccentric, A/P step downs from 4\", 6\"  8\" step up and downs 2x10 /c slow eccentrics back  SLS Balance (L 12 secs, R 3 secs)    Lateral Walks R THB 2 Laps  Manual Therapy:  Retrograde massage RIGHT foot/ankle - NT  Gentle grade II  posterior TC mobs for DF.  - NT  Gait Training:  Gait training /c focus on upright posture, corrected pt R leaning  Stair Training Reciprocal Ascending, Step to Descending  Cryotherapy (50210): Cold Pack  Location: R Ankle  Position: supine  Duration: 5 minutes  Results: decreased pain    Skilled input: verbal instruction/cues and tactile instruction/cues    Home Exercise Program: (*above indicates provided as part of home exercise program)  Ankle AROM  DF/PF 2x10  R THB  IN/EV 2x10 R THB  SLS Balance on pillow /c UE Support  Slow descending of stairs (reciprocal)        ASSESSMENT                                                                                                             1/31/2020: Pt demonstrates good improvement in AROM. Still with strength deficits in PF affecting toe-off in gait and leading to antalgic gait. Pt primarily limited by pain in heel in shoe during gait. Likely related to natural course of healing. Encouraged pt to continue with HEP (focus of PF strength) and to use shorter step length to reduce limp.  Consider contacting orthotist for orthotic adjustment if pain does not improve. Will decreased formal PT visits and begin to plan for D/C.     2/4/20: Pt demonstrates improved strength for R ankle PF and DF, overall improved activity tolerance for all OKC and CKC therex. Pt displays improved gait pattern /c consistent appropriate step length and heel toe gait pattern, still slightly antalgic gait by end of session. Added additional airex marching, side stepping, and tandem walking to facilitate improved ankle stability and balance to prepare [place]. Pt continues to lack R LE eccentric quad strength for descending stairs, requires step to gait pattern when descending for safety. Recommend further therapy to allow pt to maximize potential for return to safe and I ADLs and amb pain free.    2/25/20: Pt presents to therapy /c improved R ankle AROM, improved R ankle strength per MMT. Pt continues to displays deficits in R LE eccentric strength and control when descending stairs, requires B UE support to safely navigate 6\" step when descending. Pt unable to perform R LE heel raise in full weightbearing, decreased heel raise on total gym. Pt demonstrates improved amb, appropriate baron and increased R LE weightbearing, stance time, and heel strike. Pt exhibits poor balance for SLS balance on R LE compared to uninvolved limb. LEFS has improved to 36/80. Recommend 3 additional sessions to allow pt to maximize potential for return to safe and I ADLs and amb pain free and to reduce future falls risk.    3/4/20: Pt exhibits identical R ankle AROM as previous session, improved strength per MMT, only lacking in eversion but no pain /c resistance this session. Pt continues to exhibit decreased heel raise on R LE, decreased eccentric quad control, focus of session on improving both. Pt responded well to SLS activities /c improved balance on even and uneven surfaces. Recommen further therapy to allow pt to regain PLOF and resume safe and I ADLs and amb pain free /c decreased falls risk.    3/11/20: Pt exhibits overall improved strength in B hip and B LE, primary weakness in B hip abd, R LE and R ankle remains at similar AROM as previous session, nearly identical to uninvolved side. Pt initially exhibits R sided leaning during gait, pt able to correct after initial demonstration and cues, as well as focus on increasing B hip abd strength. Pt continues to exhibit difficulty /c deep squatting to pick objects off floor, discomfort felt along anterior portion of ankle  and dorsum of R foot. Recommend one more therapy session to assess and evaluate improvements in gait, amb tolerance, strength, and ability to perform safe and I ADLs and amb pain free.  To date the patient has made gains as expected as reported. Patient will continue to benefit from skilled care as outlined.  Patient continues to have impairments and functional deficits as noted.    Pain/symptoms after session: 1  Patient Education:  Pt is mute  Results of above outlined education: Verbalizes understanding and Demonstrates understanding     PLAN                                                                                                                         Updates to plan of care: extend current plan of care    patient involved in and agreed to plan of care and goals.    GOALS                                                                                                                     Short Term Goals (STG):    Pt to have 0/10 to allow pt to ambulate 100'' without pain in shoe without AD with proper mechanics    Status:  Progressing/ongoing  Long Term Goals: To be met by end of plan of care:      Home Exercise Program: Independent with progressed and modified home exercise program (HEP)      Status:  Progressing/ongoing    Pain: Decrease pain/symptoms to 0 while standing to allow pt to walk and be in shoe for 2 hours    Status:  Progressing/ongoing  Strength: Improve involved strength to  5, to allow pt to negotiate stairs    Status:  Progressing/ongoing  Range of Motion: Improve involved range of motion (ROM) to   WNL for driving    Status:  Met     Community Ambulation: Ambulate community distances on even terrain: independent    Status:  Progressing/ongoing  Ambulation: Demonstrate ability to negotiate level and unlevel surfaces at variable velocities, including change of direction without increased pain or instability to return to age appropriate and community activities at prior level of function  (Household tasks (domestic life))    Status:  Progressing/ongoing    Stand: Stand for 30 minutes (Washing (self-care))    Status:  Progressing/ongoing    Patient Reported Outcome Measure: Improvement in function /disability/impairment as indicated by     Status:  Progressing/ongoing        Procedures and total treatment time documented Time Entry flowsheet.

## 2020-03-30 RX ORDER — FLECAINIDE ACETATE 100 MG/1
TABLET ORAL
Qty: 270 TABLET | Refills: 1 | Status: SHIPPED | OUTPATIENT
Start: 2020-03-30 | End: 2021-02-02 | Stop reason: SDUPTHER

## 2020-04-17 ENCOUNTER — E-ADVICE (OUTPATIENT)
Dept: CARDIOLOGY | Age: 40
End: 2020-04-17

## 2020-04-21 ENCOUNTER — OFFICE VISIT (OUTPATIENT)
Dept: CARDIOLOGY | Age: 40
End: 2020-04-21

## 2020-04-21 VITALS
DIASTOLIC BLOOD PRESSURE: 75 MMHG | WEIGHT: 198 LBS | HEART RATE: 75 BPM | BODY MASS INDEX: 26.24 KG/M2 | HEIGHT: 73 IN | SYSTOLIC BLOOD PRESSURE: 129 MMHG

## 2020-04-21 DIAGNOSIS — I10 ESSENTIAL HYPERTENSION: ICD-10-CM

## 2020-04-21 DIAGNOSIS — Z86.79 ATRIAL FIBRILLATION, CURRENTLY IN SINUS RHYTHM: Primary | ICD-10-CM

## 2020-04-21 PROCEDURE — 99214 OFFICE O/P EST MOD 30 MIN: CPT | Performed by: INTERNAL MEDICINE

## 2020-04-23 ENCOUNTER — TELEPHONE (OUTPATIENT)
Dept: CARDIOLOGY | Age: 40
End: 2020-04-23

## 2020-05-21 RX ORDER — RIVAROXABAN 20 MG/1
TABLET, FILM COATED ORAL
Qty: 90 TABLET | Refills: 3 | Status: SHIPPED | OUTPATIENT
Start: 2020-05-21 | End: 2021-06-15

## 2020-10-05 ENCOUNTER — TELEPHONE (OUTPATIENT)
Dept: CARDIOLOGY | Age: 40
End: 2020-10-05

## 2020-10-20 ENCOUNTER — MED REC SCAN ONLY (OUTPATIENT)
Dept: FAMILY MEDICINE CLINIC | Facility: CLINIC | Age: 40
End: 2020-10-20

## 2020-10-20 ENCOUNTER — OFFICE VISIT (OUTPATIENT)
Dept: CARDIOLOGY | Age: 40
End: 2020-10-20

## 2020-10-20 VITALS
HEIGHT: 73 IN | SYSTOLIC BLOOD PRESSURE: 130 MMHG | DIASTOLIC BLOOD PRESSURE: 90 MMHG | WEIGHT: 216.2 LBS | BODY MASS INDEX: 28.65 KG/M2 | HEART RATE: 80 BPM

## 2020-10-20 DIAGNOSIS — Z51.81 ENCOUNTER FOR MONITORING FLECAINIDE THERAPY: ICD-10-CM

## 2020-10-20 DIAGNOSIS — Z79.899 ENCOUNTER FOR MONITORING FLECAINIDE THERAPY: ICD-10-CM

## 2020-10-20 DIAGNOSIS — Z86.79 ATRIAL FIBRILLATION, CURRENTLY IN SINUS RHYTHM: Primary | ICD-10-CM

## 2020-10-20 PROCEDURE — 93000 ELECTROCARDIOGRAM COMPLETE: CPT | Performed by: INTERNAL MEDICINE

## 2020-10-20 PROCEDURE — 99215 OFFICE O/P EST HI 40 MIN: CPT | Performed by: INTERNAL MEDICINE

## 2020-10-20 PROCEDURE — 3075F SYST BP GE 130 - 139MM HG: CPT | Performed by: INTERNAL MEDICINE

## 2020-10-20 RX ORDER — METOPROLOL SUCCINATE 50 MG/1
50 TABLET, EXTENDED RELEASE ORAL DAILY
Qty: 90 TABLET | Refills: 3 | Status: SHIPPED | OUTPATIENT
Start: 2020-10-20

## 2020-10-20 ASSESSMENT — PATIENT HEALTH QUESTIONNAIRE - PHQ9
CLINICAL INTERPRETATION OF PHQ9 SCORE: NO FURTHER SCREENING NEEDED
SUM OF ALL RESPONSES TO PHQ9 QUESTIONS 1 AND 2: 0
1. LITTLE INTEREST OR PLEASURE IN DOING THINGS: NOT AT ALL
SUM OF ALL RESPONSES TO PHQ9 QUESTIONS 1 AND 2: 0
2. FEELING DOWN, DEPRESSED OR HOPELESS: NOT AT ALL
CLINICAL INTERPRETATION OF PHQ2 SCORE: NO FURTHER SCREENING NEEDED

## 2020-11-30 ENCOUNTER — TELEPHONE (OUTPATIENT)
Dept: CARDIOLOGY | Age: 40
End: 2020-11-30

## 2020-11-30 DIAGNOSIS — I10 ESSENTIAL HYPERTENSION: ICD-10-CM

## 2020-11-30 DIAGNOSIS — Z86.79 ATRIAL FIBRILLATION, CURRENTLY IN SINUS RHYTHM: Primary | ICD-10-CM

## 2020-11-30 DIAGNOSIS — R00.2 PALPITATIONS: ICD-10-CM

## 2020-12-23 ENCOUNTER — TELEPHONE (OUTPATIENT)
Dept: CT IMAGING | Facility: HOSPITAL | Age: 40
End: 2020-12-23

## 2020-12-23 NOTE — TELEPHONE ENCOUNTER
Spoke with patient and discussed pre-procedure instructions for Pulmonary gated study. Pt will arrive at 0915 to [facility] OP registration. Okay to eat lunch breakfast and drink extra fluids day before and morning of.  Pt instructed to hold caffeine products fo

## 2020-12-28 ENCOUNTER — HOSPITAL ENCOUNTER (OUTPATIENT)
Dept: CT IMAGING | Facility: HOSPITAL | Age: 40
Discharge: HOME | End: 2020-12-28
Attending: INTERNAL MEDICINE
Payer: COMMERCIAL

## 2020-12-28 VITALS — HEART RATE: 62 BPM | SYSTOLIC BLOOD PRESSURE: 124 MMHG | DIASTOLIC BLOOD PRESSURE: 91 MMHG

## 2020-12-28 DIAGNOSIS — Z86.79 ATRIAL FIBRILLATION, CURRENTLY IN SINUS RHYTHM: ICD-10-CM

## 2020-12-28 DIAGNOSIS — Z79.899 ENCOUNTER FOR MONITORING FLECAINIDE THERAPY: ICD-10-CM

## 2020-12-28 DIAGNOSIS — Z51.81 ENCOUNTER FOR MONITORING FLECAINIDE THERAPY: ICD-10-CM

## 2020-12-28 LAB — CREAT BLD-MCNC: 0.9 MG/DL (ref 0.7–1.3)

## 2020-12-28 PROCEDURE — 75574 CT ANGIO HRT W/3D IMAGE: CPT | Performed by: INTERNAL MEDICINE

## 2020-12-28 PROCEDURE — 82565 ASSAY OF CREATININE: CPT

## 2020-12-28 NOTE — IMAGING NOTE
Received Anita Murillo to CT Rm 4 GE working with Darlene Perez. Verified name, , allergies. IV access with 20G angiocath to right antecubital area. Positioned pt on table. Procedure explained and questions answered.  Vital signs monitored and no

## 2021-01-01 ENCOUNTER — EXTERNAL RECORD (OUTPATIENT)
Dept: OTHER | Age: 41
End: 2021-01-01

## 2021-01-01 ENCOUNTER — EXTERNAL RECORD (OUTPATIENT)
Dept: HEALTH INFORMATION MANAGEMENT | Facility: OTHER | Age: 41
End: 2021-01-01

## 2021-01-04 VITALS — WEIGHT: 204 LBS | BODY MASS INDEX: 27.04 KG/M2 | HEIGHT: 73 IN

## 2021-01-04 NOTE — HISTORICAL OFFICE NOTE
Progress Notes  - documented in this encounter  Katiana Wilson MD - 10/20/2020 8:00 AM CDT  Formatting of this note might be different from the original.  [facility]  Clinic Note    Anita Murillo  : 1980  PCP: Pamela Paula DO reports that he has never smoked. He has never used smokeless tobacco. He reports previous alcohol use.     Allergies:  ALLERGIES:  No Known Allergies    Medications:  Current Outpatient Medications   Medication Sig Dispense Refill   • XARELTO 20 MG Tab RANDI

## 2021-01-05 ENCOUNTER — LAB ENCOUNTER (OUTPATIENT)
Dept: LAB | Age: 41
End: 2021-01-05
Attending: INTERNAL MEDICINE
Payer: COMMERCIAL

## 2021-01-05 DIAGNOSIS — I48.91 A-FIB (HCC): ICD-10-CM

## 2021-01-05 LAB
ANION GAP SERPL CALC-SCNC: 2 MMOL/L (ref 0–18)
BASOPHILS # BLD AUTO: 0.07 X10(3) UL (ref 0–0.2)
BASOPHILS NFR BLD AUTO: 0.9 %
BUN BLD-MCNC: 22 MG/DL (ref 7–18)
BUN/CREAT SERPL: 24.4 (ref 10–20)
CALCIUM BLD-MCNC: 9.7 MG/DL (ref 8.5–10.1)
CHLORIDE SERPL-SCNC: 103 MMOL/L (ref 98–112)
CO2 SERPL-SCNC: 31 MMOL/L (ref 21–32)
CREAT BLD-MCNC: 0.9 MG/DL
DEPRECATED RDW RBC AUTO: 38.3 FL (ref 35.1–46.3)
EOSINOPHIL # BLD AUTO: 0.25 X10(3) UL (ref 0–0.7)
EOSINOPHIL NFR BLD AUTO: 3.2 %
ERYTHROCYTE [DISTWIDTH] IN BLOOD BY AUTOMATED COUNT: 11.9 % (ref 11–15)
GLUCOSE BLD-MCNC: 88 MG/DL (ref 70–99)
HCT VFR BLD AUTO: 45.2 %
HGB BLD-MCNC: 15.4 G/DL
IMM GRANULOCYTES # BLD AUTO: 0.02 X10(3) UL (ref 0–1)
IMM GRANULOCYTES NFR BLD: 0.3 %
LYMPHOCYTES # BLD AUTO: 2.36 X10(3) UL (ref 1–4)
LYMPHOCYTES NFR BLD AUTO: 30.3 %
MCH RBC QN AUTO: 30 PG (ref 26–34)
MCHC RBC AUTO-ENTMCNC: 34.1 G/DL (ref 31–37)
MCV RBC AUTO: 87.9 FL
MONOCYTES # BLD AUTO: 0.84 X10(3) UL (ref 0.1–1)
MONOCYTES NFR BLD AUTO: 10.8 %
NEUTROPHILS # BLD AUTO: 4.24 X10 (3) UL (ref 1.5–7.7)
NEUTROPHILS # BLD AUTO: 4.24 X10(3) UL (ref 1.5–7.7)
NEUTROPHILS NFR BLD AUTO: 54.5 %
OSMOLALITY SERPL CALC.SUM OF ELEC: 285 MOSM/KG (ref 275–295)
PATIENT FASTING Y/N/NP: YES
PLATELET # BLD AUTO: 294 10(3)UL (ref 150–450)
POTASSIUM SERPL-SCNC: 4.3 MMOL/L (ref 3.5–5.1)
RBC # BLD AUTO: 5.14 X10(6)UL
SODIUM SERPL-SCNC: 136 MMOL/L (ref 136–145)
WBC # BLD AUTO: 7.8 X10(3) UL (ref 4–11)

## 2021-01-05 PROCEDURE — 85025 COMPLETE CBC W/AUTO DIFF WBC: CPT

## 2021-01-05 PROCEDURE — 36415 COLL VENOUS BLD VENIPUNCTURE: CPT

## 2021-01-05 PROCEDURE — 80048 BASIC METABOLIC PNL TOTAL CA: CPT

## 2021-01-06 LAB — SARS-COV-2 RNA RESP QL NAA+PROBE: NOT DETECTED

## 2021-01-07 ENCOUNTER — TELEPHONE (OUTPATIENT)
Dept: FAMILY MEDICINE CLINIC | Facility: CLINIC | Age: 41
End: 2021-01-07

## 2021-01-07 NOTE — TELEPHONE ENCOUNTER
Pt. Has a procedure tomorrow at [facility] and he tested neg. For covid but pt. Was around someone on Monday AM and they just tested positive. He took his covid test Tues.

## 2021-01-07 NOTE — TELEPHONE ENCOUNTER
PATIENT WAS AROUND BROTHER ON Monday, HE WENT FOR COVID TEST ON TUESDAY- CAME BACK NEGATIVE. HIS BROTHER WOKE UP WITH COVID SYMPTOMS Tuesday MORNING, AND TESTED POSITIVE. KATY IS NOW SCHEDULED FOR TOMORROW MORNING FOR ABLATION AT [facility].   WANTS TO BE

## 2021-01-08 ENCOUNTER — HOSPITAL ENCOUNTER (OUTPATIENT)
Dept: INTERVENTIONAL RADIOLOGY/VASCULAR | Facility: HOSPITAL | Age: 41
Discharge: HOME | End: 2021-01-08
Attending: INTERNAL MEDICINE
Payer: COMMERCIAL

## 2021-01-08 DIAGNOSIS — I48.91 A-FIB (HCC): Primary | ICD-10-CM

## 2021-01-12 ENCOUNTER — E-ADVICE (OUTPATIENT)
Dept: CARDIOLOGY | Age: 41
End: 2021-01-12

## 2021-02-02 ENCOUNTER — TELEPHONE (OUTPATIENT)
Dept: CARDIOLOGY | Age: 41
End: 2021-02-02

## 2021-02-02 RX ORDER — FLECAINIDE ACETATE 100 MG/1
150 TABLET ORAL 2 TIMES DAILY
Qty: 270 TABLET | Refills: 1 | Status: SHIPPED | OUTPATIENT
Start: 2021-02-02

## 2021-02-17 ENCOUNTER — LAB ENCOUNTER (OUTPATIENT)
Dept: LAB | Age: 41
End: 2021-02-17
Attending: INTERNAL MEDICINE
Payer: COMMERCIAL

## 2021-02-17 DIAGNOSIS — I48.91 A-FIB (HCC): ICD-10-CM

## 2021-02-17 LAB
ANION GAP SERPL CALC-SCNC: 5 MMOL/L (ref 0–18)
BUN BLD-MCNC: 21 MG/DL (ref 7–18)
BUN/CREAT SERPL: 26.6 (ref 10–20)
CALCIUM BLD-MCNC: 9.2 MG/DL (ref 8.5–10.1)
CHLORIDE SERPL-SCNC: 105 MMOL/L (ref 98–112)
CO2 SERPL-SCNC: 28 MMOL/L (ref 21–32)
CREAT BLD-MCNC: 0.79 MG/DL
GLUCOSE BLD-MCNC: 97 MG/DL (ref 70–99)
OSMOLALITY SERPL CALC.SUM OF ELEC: 289 MOSM/KG (ref 275–295)
PATIENT FASTING Y/N/NP: NO
POTASSIUM SERPL-SCNC: 3.9 MMOL/L (ref 3.5–5.1)
SODIUM SERPL-SCNC: 138 MMOL/L (ref 136–145)

## 2021-02-17 PROCEDURE — 36415 COLL VENOUS BLD VENIPUNCTURE: CPT

## 2021-02-17 PROCEDURE — 80048 BASIC METABOLIC PNL TOTAL CA: CPT

## 2021-02-18 LAB
SARS-COV-2 RNA RESP QL NAA+PROBE: NOT DETECTED
SARS-COV-2 RNA SPEC QL NAA+PROBE: NOT DETECTED
SPECIMEN SOURCE: NORMAL

## 2021-02-19 ENCOUNTER — HOSPITAL ENCOUNTER (OUTPATIENT)
Dept: INTERVENTIONAL RADIOLOGY/VASCULAR | Facility: HOSPITAL | Age: 41
Discharge: HOME | End: 2021-02-19
Attending: INTERNAL MEDICINE | Admitting: INTERNAL MEDICINE
Payer: COMMERCIAL

## 2021-02-19 VITALS
HEART RATE: 70 BPM | WEIGHT: 200 LBS | HEIGHT: 72 IN | BODY MASS INDEX: 27.09 KG/M2 | OXYGEN SATURATION: 98 % | RESPIRATION RATE: 14 BRPM | TEMPERATURE: 98 F | SYSTOLIC BLOOD PRESSURE: 100 MMHG | DIASTOLIC BLOOD PRESSURE: 80 MMHG

## 2021-02-19 DIAGNOSIS — I48.91 ATRIAL FIBRILLATION, UNSPECIFIED TYPE (HCC): ICD-10-CM

## 2021-02-19 DIAGNOSIS — I48.91 A-FIB (HCC): Primary | ICD-10-CM

## 2021-02-19 DIAGNOSIS — I48.92 ATRIAL FLUTTER, UNSPECIFIED TYPE (HCC): ICD-10-CM

## 2021-02-19 RX ORDER — SODIUM CHLORIDE 9 MG/ML
INJECTION, SOLUTION INTRAVENOUS
Status: DISCONTINUED | OUTPATIENT
Start: 2021-02-20 | End: 2021-02-19

## 2021-02-19 NOTE — PROGRESS NOTES
Patient being rescheduled to February 26th, 2021. Rapid covid on admit.  Cryo with anesthesia with dr Francheska Shah

## 2021-02-25 NOTE — HISTORICAL OFFICE NOTE
Progress Notes  - documented in this encounter  Randa Desai MD - 10/20/2020 8:00 AM CDT  Formatting of this note might be different from the original.  [facility]  Clinic Note    Anita Murillo  : 1980  PCP: Royal English DO reports that he has never smoked. He has never used smokeless tobacco. He reports previous alcohol use.     Allergies:  ALLERGIES:  No Known Allergies    Medications:  Current Outpatient Medications   Medication Sig Dispense Refill   • XARELTO 20 MG Tab RANDI

## 2021-02-26 ENCOUNTER — HOSPITAL ENCOUNTER (OUTPATIENT)
Dept: INTERVENTIONAL RADIOLOGY/VASCULAR | Facility: HOSPITAL | Age: 41
Discharge: HOME | End: 2021-02-26
Attending: INTERNAL MEDICINE | Admitting: INTERNAL MEDICINE
Payer: COMMERCIAL

## 2021-02-26 ENCOUNTER — ANESTHESIA EVENT (OUTPATIENT)
Dept: INTERVENTIONAL RADIOLOGY/VASCULAR | Facility: HOSPITAL | Age: 41
End: 2021-02-26
Payer: COMMERCIAL

## 2021-02-26 ENCOUNTER — APPOINTMENT (OUTPATIENT)
Dept: CARDIOLOGY | Age: 41
End: 2021-02-26

## 2021-02-26 VITALS
RESPIRATION RATE: 15 BRPM | HEIGHT: 73 IN | SYSTOLIC BLOOD PRESSURE: 112 MMHG | HEART RATE: 83 BPM | WEIGHT: 205 LBS | OXYGEN SATURATION: 100 % | DIASTOLIC BLOOD PRESSURE: 61 MMHG | BODY MASS INDEX: 27.17 KG/M2 | TEMPERATURE: 98 F

## 2021-02-26 DIAGNOSIS — I48.91 A-FIB (HCC): ICD-10-CM

## 2021-02-26 LAB
ISTAT ACTIVATED CLOTTING TIME: 235 SECONDS (ref 74–137)
ISTAT ACTIVATED CLOTTING TIME: 246 SECONDS (ref 74–137)
ISTAT ACTIVATED CLOTTING TIME: 257 SECONDS (ref 74–137)
ISTAT ACTIVATED CLOTTING TIME: 301 SECONDS (ref 74–137)
SARS-COV-2 RNA RESP QL NAA+PROBE: NOT DETECTED

## 2021-02-26 PROCEDURE — 76942 ECHO GUIDE FOR BIOPSY: CPT | Performed by: INTERNAL MEDICINE

## 2021-02-26 PROCEDURE — 82803 BLOOD GASES ANY COMBINATION: CPT

## 2021-02-26 PROCEDURE — 82330 ASSAY OF CALCIUM: CPT

## 2021-02-26 PROCEDURE — 4A0234Z MEASUREMENT OF CARDIAC ELECTRICAL ACTIVITY, PERCUTANEOUS APPROACH: ICD-10-PCS | Performed by: INTERNAL MEDICINE

## 2021-02-26 PROCEDURE — 93656 COMPRE EP EVAL ABLTJ ATR FIB: CPT

## 2021-02-26 PROCEDURE — B24BYZZ ULTRASONOGRAPHY OF HEART WITH AORTA USING OTHER CONTRAST: ICD-10-PCS | Performed by: INTERNAL MEDICINE

## 2021-02-26 PROCEDURE — 02583ZZ DESTRUCTION OF CONDUCTION MECHANISM, PERCUTANEOUS APPROACH: ICD-10-PCS | Performed by: INTERNAL MEDICINE

## 2021-02-26 PROCEDURE — B24CYZZ ULTRASONOGRAPHY OF PERICARDIUM USING OTHER CONTRAST: ICD-10-PCS | Performed by: INTERNAL MEDICINE

## 2021-02-26 PROCEDURE — 85347 COAGULATION TIME ACTIVATED: CPT

## 2021-02-26 PROCEDURE — 84295 ASSAY OF SERUM SODIUM: CPT

## 2021-02-26 PROCEDURE — 93010 ELECTROCARDIOGRAM REPORT: CPT | Performed by: INTERNAL MEDICINE

## 2021-02-26 PROCEDURE — 93662 INTRACARDIAC ECG (ICE): CPT | Performed by: INTERNAL MEDICINE

## 2021-02-26 PROCEDURE — 93662 INTRACARDIAC ECG (ICE): CPT

## 2021-02-26 PROCEDURE — 85014 HEMATOCRIT: CPT

## 2021-02-26 PROCEDURE — 76937 US GUIDE VASCULAR ACCESS: CPT

## 2021-02-26 PROCEDURE — 02K83ZZ MAP CONDUCTION MECHANISM, PERCUTANEOUS APPROACH: ICD-10-PCS | Performed by: INTERNAL MEDICINE

## 2021-02-26 PROCEDURE — 93613 INTRACARDIAC EPHYS 3D MAPG: CPT

## 2021-02-26 PROCEDURE — 4A023FZ MEASUREMENT OF CARDIAC RHYTHM, PERCUTANEOUS APPROACH: ICD-10-PCS | Performed by: INTERNAL MEDICINE

## 2021-02-26 PROCEDURE — 93656 COMPRE EP EVAL ABLTJ ATR FIB: CPT | Performed by: INTERNAL MEDICINE

## 2021-02-26 PROCEDURE — 93655 ICAR CATH ABLTJ DSCRT ARRHYT: CPT

## 2021-02-26 PROCEDURE — 93655 ICAR CATH ABLTJ DSCRT ARRHYT: CPT | Performed by: INTERNAL MEDICINE

## 2021-02-26 PROCEDURE — 93005 ELECTROCARDIOGRAM TRACING: CPT

## 2021-02-26 PROCEDURE — 84132 ASSAY OF SERUM POTASSIUM: CPT

## 2021-02-26 PROCEDURE — 93613 INTRACARDIAC EPHYS 3D MAPG: CPT | Performed by: INTERNAL MEDICINE

## 2021-02-26 RX ORDER — HYDROCODONE BITARTRATE AND ACETAMINOPHEN 5; 325 MG/1; MG/1
1 TABLET ORAL AS NEEDED
Status: DISCONTINUED | OUTPATIENT
Start: 2021-02-26 | End: 2021-02-26 | Stop reason: HOSPADM

## 2021-02-26 RX ORDER — HEPARIN SODIUM 1000 [USP'U]/ML
INJECTION, SOLUTION INTRAVENOUS; SUBCUTANEOUS
Status: COMPLETED
Start: 2021-02-26 | End: 2021-02-26

## 2021-02-26 RX ORDER — PROTAMINE SULFATE 10 MG/ML
INJECTION, SOLUTION INTRAVENOUS AS NEEDED
Status: DISCONTINUED | OUTPATIENT
Start: 2021-02-26 | End: 2021-02-26 | Stop reason: SURG

## 2021-02-26 RX ORDER — MEPERIDINE HYDROCHLORIDE 25 MG/ML
INJECTION INTRAMUSCULAR; INTRAVENOUS; SUBCUTANEOUS
Status: COMPLETED
Start: 2021-02-26 | End: 2021-02-26

## 2021-02-26 RX ORDER — ROCURONIUM BROMIDE 10 MG/ML
INJECTION, SOLUTION INTRAVENOUS AS NEEDED
Status: DISCONTINUED | OUTPATIENT
Start: 2021-02-26 | End: 2021-02-26 | Stop reason: SURG

## 2021-02-26 RX ORDER — HEPARIN SODIUM 5000 [USP'U]/ML
INJECTION, SOLUTION INTRAVENOUS; SUBCUTANEOUS
Status: COMPLETED
Start: 2021-02-26 | End: 2021-02-26

## 2021-02-26 RX ORDER — HYDROCODONE BITARTRATE AND ACETAMINOPHEN 5; 325 MG/1; MG/1
2 TABLET ORAL AS NEEDED
Status: DISCONTINUED | OUTPATIENT
Start: 2021-02-26 | End: 2021-02-26 | Stop reason: HOSPADM

## 2021-02-26 RX ORDER — PROTAMINE SULFATE 10 MG/ML
INJECTION, SOLUTION INTRAVENOUS
Status: COMPLETED
Start: 2021-02-26 | End: 2021-02-26

## 2021-02-26 RX ORDER — SODIUM CHLORIDE, SODIUM LACTATE, POTASSIUM CHLORIDE, CALCIUM CHLORIDE 600; 310; 30; 20 MG/100ML; MG/100ML; MG/100ML; MG/100ML
INJECTION, SOLUTION INTRAVENOUS CONTINUOUS
Status: DISCONTINUED | OUTPATIENT
Start: 2021-02-26 | End: 2021-02-26 | Stop reason: HOSPADM

## 2021-02-26 RX ORDER — METOCLOPRAMIDE HYDROCHLORIDE 5 MG/ML
10 INJECTION INTRAMUSCULAR; INTRAVENOUS AS NEEDED
Status: DISCONTINUED | OUTPATIENT
Start: 2021-02-26 | End: 2021-02-26 | Stop reason: HOSPADM

## 2021-02-26 RX ORDER — MIDAZOLAM HYDROCHLORIDE 1 MG/ML
INJECTION INTRAMUSCULAR; INTRAVENOUS AS NEEDED
Status: DISCONTINUED | OUTPATIENT
Start: 2021-02-26 | End: 2021-02-26 | Stop reason: SURG

## 2021-02-26 RX ORDER — ONDANSETRON 2 MG/ML
4 INJECTION INTRAMUSCULAR; INTRAVENOUS AS NEEDED
Status: DISCONTINUED | OUTPATIENT
Start: 2021-02-26 | End: 2021-02-26 | Stop reason: HOSPADM

## 2021-02-26 RX ORDER — DIPHENHYDRAMINE HYDROCHLORIDE 50 MG/ML
12.5 INJECTION INTRAMUSCULAR; INTRAVENOUS AS NEEDED
Status: DISCONTINUED | OUTPATIENT
Start: 2021-02-26 | End: 2021-02-26 | Stop reason: HOSPADM

## 2021-02-26 RX ORDER — MEPERIDINE HYDROCHLORIDE 25 MG/ML
12.5 INJECTION INTRAMUSCULAR; INTRAVENOUS; SUBCUTANEOUS AS NEEDED
Status: DISCONTINUED | OUTPATIENT
Start: 2021-02-26 | End: 2021-02-26 | Stop reason: HOSPADM

## 2021-02-26 RX ORDER — LIDOCAINE HYDROCHLORIDE 10 MG/ML
INJECTION, SOLUTION EPIDURAL; INFILTRATION; INTRACAUDAL; PERINEURAL AS NEEDED
Status: DISCONTINUED | OUTPATIENT
Start: 2021-02-26 | End: 2021-02-26 | Stop reason: SURG

## 2021-02-26 RX ORDER — NEOSTIGMINE METHYLSULFATE 1 MG/ML
INJECTION INTRAVENOUS AS NEEDED
Status: DISCONTINUED | OUTPATIENT
Start: 2021-02-26 | End: 2021-02-26 | Stop reason: SURG

## 2021-02-26 RX ORDER — SODIUM CHLORIDE 9 MG/ML
INJECTION, SOLUTION INTRAVENOUS CONTINUOUS PRN
Status: DISCONTINUED | OUTPATIENT
Start: 2021-02-26 | End: 2021-02-26 | Stop reason: SURG

## 2021-02-26 RX ORDER — ACETAMINOPHEN 325 MG/1
325 TABLET ORAL EVERY 4 HOURS PRN
Status: DISCONTINUED | OUTPATIENT
Start: 2021-02-26 | End: 2021-03-23

## 2021-02-26 RX ORDER — MIDAZOLAM HYDROCHLORIDE 1 MG/ML
1 INJECTION INTRAMUSCULAR; INTRAVENOUS EVERY 5 MIN PRN
Status: DISCONTINUED | OUTPATIENT
Start: 2021-02-26 | End: 2021-02-26 | Stop reason: HOSPADM

## 2021-02-26 RX ORDER — METOCLOPRAMIDE HYDROCHLORIDE 5 MG/ML
INJECTION INTRAMUSCULAR; INTRAVENOUS
Status: COMPLETED
Start: 2021-02-26 | End: 2021-02-26

## 2021-02-26 RX ORDER — LIDOCAINE HYDROCHLORIDE 10 MG/ML
INJECTION, SOLUTION EPIDURAL; INFILTRATION; INTRACAUDAL; PERINEURAL
Status: COMPLETED
Start: 2021-02-26 | End: 2021-02-26

## 2021-02-26 RX ORDER — ACETAMINOPHEN 325 MG/1
650 TABLET ORAL EVERY 4 HOURS PRN
Status: DISCONTINUED | OUTPATIENT
Start: 2021-02-26 | End: 2021-03-23

## 2021-02-26 RX ORDER — ONDANSETRON 2 MG/ML
INJECTION INTRAMUSCULAR; INTRAVENOUS AS NEEDED
Status: DISCONTINUED | OUTPATIENT
Start: 2021-02-26 | End: 2021-02-26 | Stop reason: SURG

## 2021-02-26 RX ORDER — ACETAMINOPHEN 325 MG/1
TABLET ORAL
Status: COMPLETED
Start: 2021-02-26 | End: 2021-02-26

## 2021-02-26 RX ORDER — SODIUM CHLORIDE 9 MG/ML
INJECTION, SOLUTION INTRAVENOUS CONTINUOUS
Status: DISCONTINUED | OUTPATIENT
Start: 2021-02-26 | End: 2021-02-26

## 2021-02-26 RX ORDER — MIDAZOLAM HYDROCHLORIDE 1 MG/ML
INJECTION INTRAMUSCULAR; INTRAVENOUS
Status: COMPLETED
Start: 2021-02-26 | End: 2021-02-26

## 2021-02-26 RX ORDER — DEXAMETHASONE SODIUM PHOSPHATE 4 MG/ML
VIAL (ML) INJECTION AS NEEDED
Status: DISCONTINUED | OUTPATIENT
Start: 2021-02-26 | End: 2021-02-26 | Stop reason: SURG

## 2021-02-26 RX ORDER — NALOXONE HYDROCHLORIDE 0.4 MG/ML
80 INJECTION, SOLUTION INTRAMUSCULAR; INTRAVENOUS; SUBCUTANEOUS AS NEEDED
Status: DISCONTINUED | OUTPATIENT
Start: 2021-02-26 | End: 2021-02-26 | Stop reason: HOSPADM

## 2021-02-26 RX ORDER — ALBUTEROL SULFATE 2.5 MG/3ML
2.5 SOLUTION RESPIRATORY (INHALATION) AS NEEDED
Status: DISCONTINUED | OUTPATIENT
Start: 2021-02-26 | End: 2021-02-26 | Stop reason: HOSPADM

## 2021-02-26 RX ORDER — PHENYLEPHRINE HCL 10 MG/ML
VIAL (ML) INJECTION AS NEEDED
Status: DISCONTINUED | OUTPATIENT
Start: 2021-02-26 | End: 2021-02-26 | Stop reason: SURG

## 2021-02-26 RX ORDER — GLYCOPYRROLATE 0.2 MG/ML
INJECTION, SOLUTION INTRAMUSCULAR; INTRAVENOUS AS NEEDED
Status: DISCONTINUED | OUTPATIENT
Start: 2021-02-26 | End: 2021-02-26 | Stop reason: SURG

## 2021-02-26 RX ORDER — HYDROMORPHONE HYDROCHLORIDE 1 MG/ML
0.4 INJECTION, SOLUTION INTRAMUSCULAR; INTRAVENOUS; SUBCUTANEOUS EVERY 5 MIN PRN
Status: DISCONTINUED | OUTPATIENT
Start: 2021-02-26 | End: 2021-02-26 | Stop reason: HOSPADM

## 2021-02-26 RX ORDER — HEPARIN SODIUM 1000 [USP'U]/ML
INJECTION, SOLUTION INTRAVENOUS; SUBCUTANEOUS AS NEEDED
Status: DISCONTINUED | OUTPATIENT
Start: 2021-02-26 | End: 2021-02-26 | Stop reason: SURG

## 2021-02-26 RX ADMIN — SODIUM CHLORIDE: 9 INJECTION, SOLUTION INTRAVENOUS at 12:39:00

## 2021-02-26 RX ADMIN — HEPARIN SODIUM 5000 UNITS: 1000 INJECTION, SOLUTION INTRAVENOUS; SUBCUTANEOUS at 13:42:00

## 2021-02-26 RX ADMIN — ACETAMINOPHEN 650 MG: 325 TABLET ORAL at 19:01:00

## 2021-02-26 RX ADMIN — HEPARIN SODIUM 10000 UNITS: 1000 INJECTION, SOLUTION INTRAVENOUS; SUBCUTANEOUS at 13:52:00

## 2021-02-26 RX ADMIN — DEXAMETHASONE SODIUM PHOSPHATE 4 MG: 4 MG/ML VIAL (ML) INJECTION at 12:56:00

## 2021-02-26 RX ADMIN — GLYCOPYRROLATE 0.8 MG: 0.2 INJECTION, SOLUTION INTRAMUSCULAR; INTRAVENOUS at 15:47:00

## 2021-02-26 RX ADMIN — PROTAMINE SULFATE 10 MG: 10 INJECTION, SOLUTION INTRAVENOUS at 15:36:00

## 2021-02-26 RX ADMIN — ROCURONIUM BROMIDE 60 MG: 10 INJECTION, SOLUTION INTRAVENOUS at 12:48:00

## 2021-02-26 RX ADMIN — MIDAZOLAM HYDROCHLORIDE 1 MG: 1 INJECTION INTRAMUSCULAR; INTRAVENOUS at 16:37:00

## 2021-02-26 RX ADMIN — MIDAZOLAM HYDROCHLORIDE 1 MG: 1 INJECTION INTRAMUSCULAR; INTRAVENOUS at 16:43:00

## 2021-02-26 RX ADMIN — MEPERIDINE HYDROCHLORIDE 12.5 MG: 25 INJECTION INTRAMUSCULAR; INTRAVENOUS; SUBCUTANEOUS at 16:40:00

## 2021-02-26 RX ADMIN — LIDOCAINE HYDROCHLORIDE 50 MG: 10 INJECTION, SOLUTION EPIDURAL; INFILTRATION; INTRACAUDAL; PERINEURAL at 12:46:00

## 2021-02-26 RX ADMIN — HEPARIN SODIUM 5000 UNITS: 1000 INJECTION, SOLUTION INTRAVENOUS; SUBCUTANEOUS at 14:06:00

## 2021-02-26 RX ADMIN — MIDAZOLAM HYDROCHLORIDE 2 MG: 1 INJECTION INTRAMUSCULAR; INTRAVENOUS at 12:39:00

## 2021-02-26 RX ADMIN — NEOSTIGMINE METHYLSULFATE 5 MG: 1 INJECTION INTRAVENOUS at 15:47:00

## 2021-02-26 RX ADMIN — SODIUM CHLORIDE: 9 INJECTION, SOLUTION INTRAVENOUS at 13:33:00

## 2021-02-26 RX ADMIN — METOCLOPRAMIDE HYDROCHLORIDE 10 MG: 5 INJECTION INTRAMUSCULAR; INTRAVENOUS at 16:20:00

## 2021-02-26 RX ADMIN — PROTAMINE SULFATE 40 MG: 10 INJECTION, SOLUTION INTRAVENOUS at 15:38:00

## 2021-02-26 RX ADMIN — ONDANSETRON 4 MG: 2 INJECTION INTRAMUSCULAR; INTRAVENOUS at 15:49:00

## 2021-02-26 RX ADMIN — PHENYLEPHRINE HCL 50 MCG: 10 MG/ML VIAL (ML) INJECTION at 14:09:00

## 2021-02-26 NOTE — ANESTHESIA POSTPROCEDURE EVALUATION
Wade Murillo Patient Status:  Outpatient in a Bed   Age/Gender 42year old male MRN [id number]   Location [facility] [address] [facility] Attending Daphne De Leon MD   Hosp Day # 0 PCP Fanny Alberts,        Anesthesia Post-op Not

## 2021-02-26 NOTE — ANESTHESIA PREPROCEDURE EVALUATION
PRE-OP EVALUATION    Patient Name: Los Kendrick    Pre-op Diagnosis: * No surgery found *    A fib ablation    Pre-op vitals reviewed. Body mass index is 27.05 kg/m². Current medications reviewed.   Hospital Medications:  No current facility-[facility]mi 02/17/2021     02/17/2021    CO2 28.0 02/17/2021    BUN 21 (H) 02/17/2021    CREATSERUM 0.79 02/17/2021    GLU 97 02/17/2021    CA 9.2 02/17/2021            Airway      Mallampati: III  Mouth opening: 3 FB  TM distance: 4 - 6 cm  Neck ROM: full Cardi

## 2021-02-27 LAB
ATRIAL RATE: 83 BPM
P AXIS: 52 DEGREES
P-R INTERVAL: 152 MS
Q-T INTERVAL: 362 MS
QRS DURATION: 110 MS
QTC CALCULATION (BEZET): 425 MS
R AXIS: 4 DEGREES
T AXIS: 62 DEGREES
VENTRICULAR RATE: 83 BPM

## 2021-02-27 NOTE — H&P
[facility]  H&P    Anita Murillo Patient Status:  Outpatient in a Bed    1980 MRN [id number]   Location [address] [facility] Attending Reshma Mejias MD   Hosp Day # 0 PCP DO Kale Walter °C)    Wt Readings from Last 3 Encounters:  02/25/21 : 205 lb (93 kg)  02/16/21 : 200 lb (90.7 kg)  01/04/21 : 204 lb (92.5 kg)      Telemetry: AF  General: Alert and oriented in no apparent distress. HEENT: No focal deficits.   Neck: No JVD, carotids 2+ n

## 2021-02-27 NOTE — PROGRESS NOTES
Patient arrived from PACU via bed supine with c/o of right shoulder pain, explained that pain after ablation in shoulder is common, ice pack applied, patient feels better. Patient taking in ice chips and tolerating.   Bilat groin sites soft c/d/i, old drain

## 2021-02-27 NOTE — PROGRESS NOTES
Bedrest completed, urinating without  Difficulty, ambulating in halls without complaints, right drsg changed d/t old drainage, site remains soft c/d/i, new drsg applied. Discharge instructions given/explained to patient/spouse at bedside all questions answe

## 2021-02-27 NOTE — PROCEDURES
[facility]   Procedure Note    Lucrecia Conte Location: Cath Lab   CSN [id number] MRN [id number]   Admission Date 2/26/2021 Operation Date 2/26/2021   Attending Physician Terrell Bardales MD Operating Physician Mark Quinonez MD     Pre-Operative Thersia Pro oclock position until tenting was clearly   visualized within the interatrial septum. The needle was advanced   through the fossa ovalis, this was confirmed by injecting IV contrast,  and visualizing it by echocardiography.  The dilator   and sheath were ge achieved. Catheter positions were monitored and   shadowed by the NavX mapping system as well. We continued with RF   application until bidirectional block across the cavotricuspid isthmus was achieved.    Bidirectional block was confirmed by pacing from

## 2021-03-01 ENCOUNTER — TELEPHONE (OUTPATIENT)
Dept: CARDIOLOGY | Age: 41
End: 2021-03-01

## 2021-03-01 LAB
GLUCOSE BLD-MCNC: 88 MG/DL (ref 70–99)
ISTAT BLOOD GAS BASE EXCESS: 1 MMOL/L (ref ?–30)
ISTAT BLOOD GAS HCO3: 25.6 MEQ/L (ref 22–26)
ISTAT BLOOD GAS O2 SATURATION: 100 % (ref 92–100)
ISTAT BLOOD GAS PCO2: 41.2 MMHG (ref 35–45)
ISTAT BLOOD GAS PH: 7.4 (ref 7.35–7.45)
ISTAT BLOOD GAS PO2: >400 MMHG (ref 80–105)
ISTAT BLOOD GAS TCO2: 27 MMOL/L (ref 22–32)
ISTAT HEMATOCRIT: 39 %
ISTAT IONIZED CALCIUM: 1.12 MMOL/L (ref 1.12–1.32)
ISTAT POTASSIUM: 3.6 MMOL/L (ref 3.6–5.1)
ISTAT SODIUM: 140 MMOL/L (ref 136–145)

## 2021-03-08 ENCOUNTER — TELEPHONE (OUTPATIENT)
Dept: CARDIOLOGY | Age: 41
End: 2021-03-08

## 2021-03-15 ENCOUNTER — TELEPHONE (OUTPATIENT)
Dept: FAMILY MEDICINE CLINIC | Facility: CLINIC | Age: 41
End: 2021-03-15

## 2021-03-15 NOTE — TELEPHONE ENCOUNTER
Patient is upset, has been with Dr Bill Molina for past year for at [facility]. Has had several cardioversions, and most recently an ablation.   He has appt this Thursday, 3/18/2021, with Dr Anne Solis nurse and being told his insurance is out of network, and payment re

## 2021-03-16 NOTE — TELEPHONE ENCOUNTER
Patient aware still haven't heard back regarding his situation. Lee Ford, , will be the one contacting him. In past, patient has never had to pay anything up front, and never told he is out of network.

## 2021-03-22 NOTE — TELEPHONE ENCOUNTER
Emailed Sinea Zaragoza -she was contacting the manager for Dr. [facility]don[facility] office. She indicated that Anita had an appointment Thursday 3/18/21.

## 2021-05-10 ENCOUNTER — TELEPHONE (OUTPATIENT)
Dept: CARDIOLOGY | Age: 41
End: 2021-05-10

## 2021-05-18 ENCOUNTER — OFFICE VISIT (OUTPATIENT)
Dept: FAMILY MEDICINE CLINIC | Facility: CLINIC | Age: 41
End: 2021-05-18
Payer: COMMERCIAL

## 2021-05-18 VITALS
WEIGHT: 208 LBS | SYSTOLIC BLOOD PRESSURE: 128 MMHG | OXYGEN SATURATION: 97 % | BODY MASS INDEX: 27.57 KG/M2 | TEMPERATURE: 99 F | DIASTOLIC BLOOD PRESSURE: 88 MMHG | HEIGHT: 73 IN | HEART RATE: 96 BPM

## 2021-05-18 DIAGNOSIS — I48.20 CHRONIC ATRIAL FIBRILLATION (HCC): Primary | ICD-10-CM

## 2021-05-18 DIAGNOSIS — I10 ESSENTIAL HYPERTENSION: ICD-10-CM

## 2021-05-18 PROCEDURE — 3074F SYST BP LT 130 MM HG: CPT | Performed by: FAMILY MEDICINE

## 2021-05-18 PROCEDURE — 3008F BODY MASS INDEX DOCD: CPT | Performed by: FAMILY MEDICINE

## 2021-05-18 PROCEDURE — 99213 OFFICE O/P EST LOW 20 MIN: CPT | Performed by: FAMILY MEDICINE

## 2021-05-18 PROCEDURE — 3079F DIAST BP 80-89 MM HG: CPT | Performed by: FAMILY MEDICINE

## 2021-05-18 NOTE — PROGRESS NOTES
HPI/Subjective:   Patient ID: Nicole Frias is a 42year old male. Hx ablation  Dr Cristin Parmar  Persistent atrial fibrillation. Unable to get in to see Dr. Cristin Parmar. Difficulties with insurance. Breathing stable. Without chest pain.   Without shortness of ritchie
Parents

## 2021-05-18 NOTE — PATIENT INSTRUCTIONS
Medication as directed. Obtain list of specialists, Cardio electrophysiologist  Call with questions or problems.

## 2021-05-24 ENCOUNTER — TELEPHONE (OUTPATIENT)
Dept: CARDIOLOGY | Age: 41
End: 2021-05-24

## 2021-06-15 RX ORDER — RIVAROXABAN 20 MG/1
TABLET, FILM COATED ORAL
Qty: 90 TABLET | Refills: 3 | Status: SHIPPED | OUTPATIENT
Start: 2021-06-15

## 2021-07-02 ENCOUNTER — TELEPHONE (OUTPATIENT)
Dept: FAMILY MEDICINE CLINIC | Facility: CLINIC | Age: 41
End: 2021-07-02

## 2021-07-02 DIAGNOSIS — I10 ESSENTIAL HYPERTENSION: ICD-10-CM

## 2021-07-02 DIAGNOSIS — I48.20 CHRONIC ATRIAL FIBRILLATION (HCC): Primary | ICD-10-CM

## 2021-07-02 NOTE — TELEPHONE ENCOUNTER
Given information to patient for Dr Nelda Lim at [facility] [address]   Ph# [phone]    Above information given to Anita  Faxed referral and face sheet to Dr Altamirano Pump office and [facility]

## 2021-09-13 ENCOUNTER — TELEPHONE (OUTPATIENT)
Dept: FAMILY MEDICINE CLINIC | Facility: CLINIC | Age: 41
End: 2021-09-13

## 2021-09-13 ENCOUNTER — OFFICE VISIT (OUTPATIENT)
Dept: FAMILY MEDICINE CLINIC | Facility: CLINIC | Age: 41
End: 2021-09-13
Payer: COMMERCIAL

## 2021-09-13 VITALS
SYSTOLIC BLOOD PRESSURE: 130 MMHG | TEMPERATURE: 98 F | BODY MASS INDEX: 27.83 KG/M2 | DIASTOLIC BLOOD PRESSURE: 80 MMHG | WEIGHT: 210 LBS | HEIGHT: 73 IN | HEART RATE: 88 BPM

## 2021-09-13 DIAGNOSIS — I48.20 CHRONIC ATRIAL FIBRILLATION (HCC): Primary | ICD-10-CM

## 2021-09-13 DIAGNOSIS — I10 ESSENTIAL HYPERTENSION: ICD-10-CM

## 2021-09-13 PROCEDURE — 3008F BODY MASS INDEX DOCD: CPT | Performed by: FAMILY MEDICINE

## 2021-09-13 PROCEDURE — 3075F SYST BP GE 130 - 139MM HG: CPT | Performed by: FAMILY MEDICINE

## 2021-09-13 PROCEDURE — 3079F DIAST BP 80-89 MM HG: CPT | Performed by: FAMILY MEDICINE

## 2021-09-13 PROCEDURE — 99214 OFFICE O/P EST MOD 30 MIN: CPT | Performed by: FAMILY MEDICINE

## 2021-09-13 NOTE — PROGRESS NOTES
Subjective:   Patient ID: Renato Jasmine is a 42year old   Episode near syncope at [facility] / Low BP  W/o pain  S/p cardioversion - persistent Afib    HPI    History/Other:   Review of Systems   Constitutional: Negative for chills, fatigue and fever.    Bhavesh Aldrich this encounter       Imaging & Referrals:  None

## 2021-09-13 NOTE — PATIENT INSTRUCTIONS
Reassurance given. Increase water intake. Follow-up with Dr. Trudy Baez. Monitor blood pressure. Call with questions or problems.

## 2021-09-13 NOTE — TELEPHONE ENCOUNTER
Patient had episode on Friday at [facility]- sweating excessively, B/P 98/50; pulse-70 to 140; was checked out by EMT; they wanted to take him to ER; but he went home and rested. Saturday felt sluggish, B/P was good. 118/85.   Patient being told cardiologistrashad

## 2021-11-05 ENCOUNTER — TELEPHONE (OUTPATIENT)
Dept: CARDIOLOGY | Age: 41
End: 2021-11-05

## 2021-12-09 ENCOUNTER — TELEPHONE (OUTPATIENT)
Dept: FAMILY MEDICINE CLINIC | Facility: CLINIC | Age: 41
End: 2021-12-09

## 2021-12-09 NOTE — TELEPHONE ENCOUNTER
Patient has hypothetical questions for Dr Amada Berg- if he would come down  With covid, and he could get his hands on Ivermectin; would it be ok to take with being on Xaralto and Metoprolol? .  ALSO, hypothetical what would dose be and how long to take

## 2021-12-11 ENCOUNTER — VIRTUAL PHONE E/M (OUTPATIENT)
Dept: FAMILY MEDICINE CLINIC | Facility: CLINIC | Age: 41
End: 2021-12-11
Payer: COMMERCIAL

## 2021-12-11 DIAGNOSIS — F41.9 ANXIETY: ICD-10-CM

## 2021-12-11 DIAGNOSIS — U07.1 COVID-19: Primary | ICD-10-CM

## 2021-12-11 PROCEDURE — 99213 OFFICE O/P EST LOW 20 MIN: CPT | Performed by: FAMILY MEDICINE

## 2021-12-11 NOTE — TELEPHONE ENCOUNTER
Pt tested positive for covid on Thursday. His wife said he isn't getting better & wanted to discuss the antibody infusion. He has been on Ivermectin since Friday.   Kale Guadarrama - [phone]

## 2021-12-11 NOTE — PROGRESS NOTES
Subjective:   Patient ID: Shavon Jacobo is a 42year old male. Phone visit  Patient started Tuesday/Wednesday not feeling well. Thursday symptoms seem to progress. Did home Covid test and was positive. Started ivermectin on Thursday. 2 doses.   Taking

## 2021-12-12 RX ORDER — ALPRAZOLAM 0.25 MG/1
0.25 TABLET ORAL 3 TIMES DAILY PRN
Qty: 25 TABLET | Refills: 0 | Status: SHIPPED | OUTPATIENT
Start: 2021-12-12

## 2021-12-14 ENCOUNTER — TELEPHONE (OUTPATIENT)
Dept: FAMILY MEDICINE CLINIC | Facility: CLINIC | Age: 41
End: 2021-12-14

## 2021-12-14 NOTE — TELEPHONE ENCOUNTER
Condition update- getting better, but still has fatigue, gets \"tired as hell. \"  Not sleeping very good. T-98.3   No cough.   Taking Xanax prior to bed

## 2022-06-21 ENCOUNTER — TELEPHONE (OUTPATIENT)
Dept: FAMILY MEDICINE CLINIC | Facility: CLINIC | Age: 42
End: 2022-06-21

## 2022-06-21 DIAGNOSIS — I48.20 CHRONIC ATRIAL FIBRILLATION (HCC): Primary | ICD-10-CM

## 2022-07-15 RX ORDER — METOPROLOL SUCCINATE 50 MG/1
50 TABLET, EXTENDED RELEASE ORAL DAILY
Qty: 60 TABLET | Refills: 5 | Status: SHIPPED | OUTPATIENT
Start: 2022-07-15

## 2022-07-15 NOTE — TELEPHONE ENCOUNTER
PT NEEDS REFILL ON-    METOPROLOL ER SUCCINTE 50MG    WAS PRESCRIBED BY DIFFERENT DR-  COULD YOU REFILL THIS?       [id number] - [place], [address] [place] [address] [place] [address] [place] [address] ([address]), [phone], [phone]    PLEASE ADVISE-THANK YOU

## 2022-07-25 ENCOUNTER — TELEPHONE (OUTPATIENT)
Dept: FAMILY MEDICINE CLINIC | Facility: CLINIC | Age: 42
End: 2022-07-25

## 2022-07-25 DIAGNOSIS — I48.20 CHRONIC ATRIAL FIBRILLATION (HCC): Primary | ICD-10-CM

## 2022-07-25 NOTE — TELEPHONE ENCOUNTER
KATY IS ON HIS WAY HOME FROM [facility], SEEING CARDIOLOGIST. HE HAS TO BE ON XARELTO 20 MG FOR 30 MORE DAYS; THEN HAS TO HAVE AVAILABLE IF BY CHANCE GOES BACK INTO A. FIB. UPDATED TO DR Saskia Miranda.   [employer] SAMPLES

## 2022-10-17 NOTE — ANESTHESIA PROCEDURE NOTES
Airway  Date/Time: 2/26/2021 12:50 PM  Urgency: elective      General Information and Staff    Patient location during procedure: OR  Anesthesiologist: Jake Mott MD  Performed: anesthesiologist     Indications and Patient Condition  Indications for a
Arterial Line  Performed by: Josiah Collier MD  Authorized by: Josiah Collier MD     General Information and Staff    Procedure Start:  2/26/2021 12:52 PM  Procedure End:  2/26/2021 12:53 PM  Anesthesiologist:  Josiah Collier MD  Performed By:  Patricia Caicedo
Male

## 2023-02-01 NOTE — TELEPHONE ENCOUNTER
Joey 20 mg samples at . Wife notified. Procedure explained and consent obtained by Radiologist.  Patient arrived to IR Suite via stretcher for Bilateral nephrostomy tube change out  Time out performed- Pt positioned on IR table-      Pt received Versed 6 mg, Fentanyl 125 mcg  IV-  VS monitored for duration of procedure and maintained stable.   Urine collected from bilateral new tubes and submitted for urine cultures.    Pt transported back to DSU via bed in stable condition. Bedside report given BETZY Teixeira

## 2023-03-15 ENCOUNTER — PATIENT OUTREACH (OUTPATIENT)
Dept: FAMILY MEDICINE CLINIC | Facility: CLINIC | Age: 43
End: 2023-03-15

## 2023-09-28 NOTE — TELEPHONE ENCOUNTER
Problem: Adult Inpatient Plan of Care  Goal: Plan of Care Review  Outcome: Ongoing, Progressing  Flowsheets (Taken 9/28/2023 1254)  Plan of Care Reviewed With: patient     Patient tolerated Opdivo treatment well. IV flushed with blood return, saline locked, and removed. AVS provided and reviewed. Ambulates per self. No acute distress noted.     Pt. Had a issue this weekend with his bp and he has some questions.

## 2025-04-28 ENCOUNTER — TELEPHONE (OUTPATIENT)
Dept: FAMILY MEDICINE CLINIC | Facility: CLINIC | Age: 45
End: 2025-04-28

## 2025-04-28 NOTE — TELEPHONE ENCOUNTER
Spoke with patient and patient was not aware of Dr. Reed retiring, unsure who he will establish care with.

## 2025-05-03 ENCOUNTER — PATIENT OUTREACH (OUTPATIENT)
Dept: CASE MANAGEMENT | Age: 45
End: 2025-05-03

## 2025-05-03 NOTE — PROCEDURES
The office order for PCP removal request is Approved and finalized on May 3, 2025.    Removed Inocencio Reed DO as the patient's Primary Care Physician

## (undated) NOTE — ED AVS SNAPSHOT
Nicole Frias   MRN: [id number]    Department:  [facility] Emergency Department   Date of Visit:  12/8/2019           Disclosure     Insurance plans vary and the physician(s) referred by the ER may not be covered by your plan.  Please contact your tell this physician (or your personal doctor if your instructions are to return to your personal doctor) about any new or lasting problems. The primary care or specialist physician will see patients referred from the [facility] Emergency Department.  Arthor Bernheim

## (undated) NOTE — LETTER
[facility] [address], [address] [place] [address]  Consent for Procedure/Sedation    Date:     Time:       1. I authorize the performance upon [address] the following:  Cardioversion.      2. I authorize Dr. Leonard Knight (and whomever is designated Witness: _________________________      Date: ___________________    Printed: 2019   7:57 AM  Patient Name: Batool Villagomez        : 1980       Medical Record #: [id number]

## (undated) NOTE — LETTER
08/09/17        [address] [place] [phone]        Dear Fortunato Hassan,    This letter is a reminder that you are due for an office visit. Please call our office at your earliest convenience to schedule an appointment.       Thank you,

## (undated) NOTE — LETTER
[facility] [address], [address] [place] [address]  Consent for Procedure/Sedation    Date:     Time:       1. I authorize the performance upon [address] [place] the following:  Cardioversion,     2.  I authorize Dr. Dory Kay (and whomever is designated Witness: _________________________      Date: ___________________    Printed: 2020   9:45 AM  Patient Name: Kurtis Bai        : 1980       Medical Record #: [id number]

## (undated) NOTE — Clinical Note
NCM spoke with pt today for TCM. Pt has not scheduled an HFU at this time d/t work conflicts. Pt to see Cornelio Free in 3 weeks for f/up new onset a fib.   NCM sent a TE to PCP office regarding f/up with pt to schedule an HFU as pt moderate risk for readmiss